# Patient Record
Sex: FEMALE | Race: WHITE | Employment: FULL TIME | URBAN - METROPOLITAN AREA
[De-identification: names, ages, dates, MRNs, and addresses within clinical notes are randomized per-mention and may not be internally consistent; named-entity substitution may affect disease eponyms.]

---

## 2017-01-04 ENCOUNTER — HOSPITAL ENCOUNTER (OUTPATIENT)
Facility: HOSPITAL | Age: 37
Discharge: HOME | End: 2017-01-04
Attending: SURGERY
Payer: COMMERCIAL

## 2017-01-04 ENCOUNTER — OFFICE VISIT (OUTPATIENT)
Dept: OBGYN CLINIC | Facility: CLINIC | Age: 37
End: 2017-01-04

## 2017-01-04 ENCOUNTER — OFFICE VISIT (OUTPATIENT)
Dept: SURGERY | Facility: CLINIC | Age: 37
End: 2017-01-04

## 2017-01-04 VITALS
DIASTOLIC BLOOD PRESSURE: 72 MMHG | WEIGHT: 169.5 LBS | SYSTOLIC BLOOD PRESSURE: 117 MMHG | BODY MASS INDEX: 26.92 KG/M2 | HEART RATE: 74 BPM | HEIGHT: 66.5 IN

## 2017-01-04 VITALS — HEIGHT: 66.5 IN

## 2017-01-04 DIAGNOSIS — Z91.89 AT HIGH RISK FOR BREAST CANCER: Primary | ICD-10-CM

## 2017-01-04 DIAGNOSIS — Z30.431 INTRAUTERINE DEVICE SURVEILLANCE: Primary | ICD-10-CM

## 2017-01-04 DIAGNOSIS — R12 HEARTBURN: ICD-10-CM

## 2017-01-04 PROCEDURE — 99213 OFFICE O/P EST LOW 20 MIN: CPT | Performed by: ADVANCED PRACTICE MIDWIFE

## 2017-01-04 PROCEDURE — 99211 OFF/OP EST MAY X REQ PHY/QHP: CPT | Performed by: SURGERY

## 2017-01-04 PROCEDURE — 99214 OFFICE O/P EST MOD 30 MIN: CPT | Performed by: SURGERY

## 2017-01-04 NOTE — PROGRESS NOTES
High [facility] Breast Cancer Scre[facility]    History of Present Illness:   14-year-old woman who returns for follow-up related to her high risk of developing breast cancer secondary to her family history, which is detailed below.  The patient' mouth daily. Disp: 30 tablet Rfl: 12     No current facility-administered medications on file prior to visit.   Allergies:   No known drug allergies   No latex allergies   Family History:   She is not of Ashkenazi [place] ancestry   Mother with breast cancer change in bowel habits, diarrhea, abdominal pain or vomiting blood.  +reflux symptoms   Genitourinary:   The patient denies frequent urination, needing to get up at night to urinate, urinary hesitancy or retaining urine, painful urination, urinary incontine murmurs, rubs, gallops or thrills. Breasts: Her breasts are symmetrical with a cup size DD. Right breast[de-identified] The skin, nipple ,and areola appear normal. There is no skin dimpling with movement of the pectoralis. There is no nipple retraction.  No nipple d risk estimates to her and answered questions she had pertaining to her level of risk. The patient's lifetime risk for breast cancer is elevated when compared to her peer group.  We discussed factors that would further increase her risk for breast cancer jeanette moderation in alcohol use, and avoidance of long-term hormone replacement therapy in the future. We also discussed the benefit of a cumulative time of eighteen months or more of breastfeeding.    We then touched upon the issue of the Left breast sebaceous c

## 2017-01-04 NOTE — PROGRESS NOTES
Follow up: clinical surveillance visit    Education Record    Learner:  Patient    Disease / Diagnosis: clinical surveillance visit    Barriers / Limitations:  None   Comments:    Method:  Discussion   Comments:    General Topics:  Plan of care reviewed

## 2017-01-04 NOTE — PROGRESS NOTES
HPI:    Patient ID: Aylin Meyer is a 39year old female here for IUD check. Had Paragard placed last week and afraid that came out. Is unable to feel strings.  Not sure likes it, feels like it decreased her milk supply, gave her insomnia and headache

## 2017-01-24 ENCOUNTER — TELEPHONE (OUTPATIENT)
Dept: OBGYN CLINIC | Facility: CLINIC | Age: 37
End: 2017-01-24

## 2017-01-25 NOTE — TELEPHONE ENCOUNTER
Pt states cancelled IUD check appt scheduled for today as was here on 1/14/17 for IUD check due to concerns and as was unable to feel strings.  Pt states is just starting a new job and is \"hard to take time off\" Per MES while in the office if pt can feel

## 2017-03-24 ENCOUNTER — TELEPHONE (OUTPATIENT)
Dept: OBGYN CLINIC | Facility: CLINIC | Age: 37
End: 2017-03-24

## 2017-03-28 RX ORDER — VALACYCLOVIR HYDROCHLORIDE 1 G/1
TABLET, FILM COATED ORAL
Qty: 21 TABLET | Refills: 0 | Status: SHIPPED | OUTPATIENT
Start: 2017-03-28 | End: 2017-08-22

## 2017-03-28 NOTE — TELEPHONE ENCOUNTER
Message noted: Chart reviewed and may refill medication as requested. Prescription sent to listed pharmacy. Please notify patient.  Further refills as per Dr. Kang So

## 2017-03-28 NOTE — TELEPHONE ENCOUNTER
LMTCB on  h#, RN ext [phone] till 730 pm.   pls triage when pt call back, thanks.          Refill Protocol Appointment Criteria  · Appointment scheduled in the past 6 months or in the next 3 months  Recent Visits       Provider Department Primary Dx    1 ye

## 2017-08-09 ENCOUNTER — OFFICE VISIT (OUTPATIENT)
Dept: OBGYN CLINIC | Facility: CLINIC | Age: 37
End: 2017-08-09

## 2017-08-09 VITALS
BODY MASS INDEX: 28 KG/M2 | DIASTOLIC BLOOD PRESSURE: 80 MMHG | WEIGHT: 174.38 LBS | HEART RATE: 84 BPM | SYSTOLIC BLOOD PRESSURE: 128 MMHG

## 2017-08-09 DIAGNOSIS — Z30.431 IUD CHECK UP: Primary | ICD-10-CM

## 2017-08-09 PROCEDURE — 99213 OFFICE O/P EST LOW 20 MIN: CPT | Performed by: ADVANCED PRACTICE MIDWIFE

## 2017-08-22 ENCOUNTER — OFFICE VISIT (OUTPATIENT)
Dept: FAMILY MEDICINE CLINIC | Facility: CLINIC | Age: 37
End: 2017-08-22

## 2017-08-22 VITALS
DIASTOLIC BLOOD PRESSURE: 81 MMHG | BODY MASS INDEX: 28.13 KG/M2 | SYSTOLIC BLOOD PRESSURE: 123 MMHG | HEIGHT: 66 IN | TEMPERATURE: 98 F | WEIGHT: 175 LBS | RESPIRATION RATE: 20 BRPM | HEART RATE: 80 BPM

## 2017-08-22 DIAGNOSIS — M25.562 ACUTE PAIN OF LEFT KNEE: Primary | ICD-10-CM

## 2017-08-22 PROCEDURE — 99212 OFFICE O/P EST SF 10 MIN: CPT | Performed by: FAMILY MEDICINE

## 2017-08-22 PROCEDURE — 99213 OFFICE O/P EST LOW 20 MIN: CPT | Performed by: FAMILY MEDICINE

## 2017-08-22 RX ORDER — VALACYCLOVIR HYDROCHLORIDE 1 G/1
1 TABLET, FILM COATED ORAL EVERY 12 HOURS SCHEDULED
Qty: 20 TABLET | Refills: 0 | Status: SHIPPED | OUTPATIENT
Start: 2017-08-22 | End: 2018-04-01

## 2017-08-22 NOTE — PROGRESS NOTES
HPI:    Patient ID: Martha Ratliff is a 39year old female. Pt presents with left knee pain that began yesterday after running. Pt denies any acute injury. Pt had pain under the knee cap. Pt has had no fall or injury. NO swelling or redness.

## 2017-11-20 ENCOUNTER — OFFICE VISIT (OUTPATIENT)
Dept: OBGYN CLINIC | Facility: CLINIC | Age: 37
End: 2017-11-20

## 2017-11-20 ENCOUNTER — TELEPHONE (OUTPATIENT)
Dept: OBGYN CLINIC | Facility: CLINIC | Age: 37
End: 2017-11-20

## 2017-11-20 VITALS
DIASTOLIC BLOOD PRESSURE: 74 MMHG | HEART RATE: 67 BPM | WEIGHT: 171 LBS | SYSTOLIC BLOOD PRESSURE: 120 MMHG | BODY MASS INDEX: 28 KG/M2

## 2017-11-20 DIAGNOSIS — Z30.431 SURVEILLANCE OF (INTRAUTERINE) CONTRACEPTIVE DEVICE: Primary | ICD-10-CM

## 2017-11-20 DIAGNOSIS — N92.6 IRREGULAR BLEEDING: ICD-10-CM

## 2017-11-20 DIAGNOSIS — N89.8 VAGINAL DISCHARGE: ICD-10-CM

## 2017-11-20 PROCEDURE — 81025 URINE PREGNANCY TEST: CPT | Performed by: ADVANCED PRACTICE MIDWIFE

## 2017-11-20 PROCEDURE — 99213 OFFICE O/P EST LOW 20 MIN: CPT | Performed by: ADVANCED PRACTICE MIDWIFE

## 2017-11-20 NOTE — TELEPHONE ENCOUNTER
Per the pt she has an IUD, and has had vaginal bleeding for about 12 days. The pt would like to speak with a nurse. Please advise.

## 2017-11-20 NOTE — TELEPHONE ENCOUNTER
Has had iud x5 mos. Has had 4 normal periods. This month having heavy period lasting 12 days. Soaked through pad today. Denies any pain. Has not checked strings. Offered appt w/ MBW today. Pt accepted.  Verbalized an understanding & agrees w/ plan

## 2017-11-21 NOTE — PROGRESS NOTES
HPI:   Anand Diallo is a 40year old female who presents for a gyne Problem. Excessive and long menses with paragard. Happy with method otherwise. This period x 12 days. Has periods q35+ days.  monogamous, has not palpated strings.       Wt Raul Mejia Father    • Cancer Maternal Grandfather      Esophageal   • Cancer Paternal Grandfather      Prostate and Esophageal   • Breast Cancer Maternal Cousin Female       Social History:   Smoking status: Never Smoker

## 2017-11-22 RX ORDER — FLUCONAZOLE 150 MG/1
150 TABLET ORAL
Qty: 2 TABLET | Refills: 0 | Status: SHIPPED | OUTPATIENT
Start: 2017-11-22 | End: 2017-11-26

## 2017-11-28 ENCOUNTER — APPOINTMENT (OUTPATIENT)
Dept: GENERAL RADIOLOGY | Facility: HOSPITAL | Age: 37
End: 2017-11-28
Attending: EMERGENCY MEDICINE
Payer: COMMERCIAL

## 2017-11-28 ENCOUNTER — NURSE TRIAGE (OUTPATIENT)
Dept: OTHER | Age: 37
End: 2017-11-28

## 2017-11-28 ENCOUNTER — HOSPITAL ENCOUNTER (EMERGENCY)
Facility: HOSPITAL | Age: 37
Discharge: HOME | End: 2017-11-28
Attending: EMERGENCY MEDICINE
Payer: COMMERCIAL

## 2017-11-28 VITALS
HEIGHT: 67 IN | BODY MASS INDEX: 26.37 KG/M2 | OXYGEN SATURATION: 97 % | WEIGHT: 168 LBS | DIASTOLIC BLOOD PRESSURE: 71 MMHG | SYSTOLIC BLOOD PRESSURE: 129 MMHG | TEMPERATURE: 97 F | RESPIRATION RATE: 15 BRPM | HEART RATE: 67 BPM

## 2017-11-28 DIAGNOSIS — R07.89 CHEST PAIN, ATYPICAL: Primary | ICD-10-CM

## 2017-11-28 PROCEDURE — 93005 ELECTROCARDIOGRAM TRACING: CPT

## 2017-11-28 PROCEDURE — 81025 URINE PREGNANCY TEST: CPT

## 2017-11-28 PROCEDURE — 99285 EMERGENCY DEPT VISIT HI MDM: CPT

## 2017-11-28 PROCEDURE — 84484 ASSAY OF TROPONIN QUANT: CPT | Performed by: EMERGENCY MEDICINE

## 2017-11-28 PROCEDURE — 80048 BASIC METABOLIC PNL TOTAL CA: CPT

## 2017-11-28 PROCEDURE — 84484 ASSAY OF TROPONIN QUANT: CPT

## 2017-11-28 PROCEDURE — 80048 BASIC METABOLIC PNL TOTAL CA: CPT | Performed by: EMERGENCY MEDICINE

## 2017-11-28 PROCEDURE — 93010 ELECTROCARDIOGRAM REPORT: CPT | Performed by: INTERNAL MEDICINE

## 2017-11-28 PROCEDURE — 71010 XR CHEST AP PORTABLE  (CPT=71010): CPT | Performed by: EMERGENCY MEDICINE

## 2017-11-28 PROCEDURE — 85025 COMPLETE CBC W/AUTO DIFF WBC: CPT

## 2017-11-28 PROCEDURE — 36415 COLL VENOUS BLD VENIPUNCTURE: CPT

## 2017-11-28 PROCEDURE — 85379 FIBRIN DEGRADATION QUANT: CPT | Performed by: EMERGENCY MEDICINE

## 2017-11-28 PROCEDURE — 85025 COMPLETE CBC W/AUTO DIFF WBC: CPT | Performed by: EMERGENCY MEDICINE

## 2017-11-28 NOTE — TELEPHONE ENCOUNTER
Action Requested: Summary for Provider     []  Critical Lab, Recommendations Needed  [] Need Additional Advice  [x]   FYI    []   Need Orders  [] Need Medications Sent to Pharmacy  []  Other     SUMMARY: Advised ER per protocol.  Pt agrees and states will g

## 2017-11-28 NOTE — ED PROVIDER NOTES
Patient Seen in: [facility] AND [facility] Emergency Department    History   No chief complaint on file.     Stated Complaint: Chest Pain    HPI    Healthy 26-year-old female who is had several episodes of nonexertional anterior chest tightness relieved by Darling  Normocephalic and atraumatic. Eyes: Conjunctivae are normal. Pupils are equal, round, and reactive to light. Neck: Normal range of motion. Neck supple. Cardiovascular: Normal rate, regular rhythm and intact distal pulses. No obvious murmur.   Pulmona 1306  ------------------------------------------------------------       MDM   Patient's EKGs and troponins are normal.  Her d-dimer is normal.  She has low risk with a heart score of 0.   Her symptoms are nonexertional and somewhat atypical.  She knows to

## 2017-11-28 NOTE — ED INITIAL ASSESSMENT (HPI)
Pt states chest tightness since this past Saturday- states that she took ASA and it went away- states woke up during the night and felt it- pt states heavy cardiac family hx so would like to be checked out. Denies SOB, f/c, cough. ASA taken today at 0800.

## 2017-11-29 NOTE — TELEPHONE ENCOUNTER
LMTCB    Please transfer to RN Triage. Thank you. Disposition note copied from ED visit 11/28/17 as below;     Disposition and Plan      Clinical Impression:  Chest pain, atypical  (primary encounter diagnosis)     Disposition:  Discharge  11/28/2017

## 2017-11-30 NOTE — TELEPHONE ENCOUNTER
Patient returning call, states she is feeling fine today. Booked pt for ER f/u visit with Dr Eduar Overton in [facility] for Saturday 11:10.

## 2017-12-02 ENCOUNTER — OFFICE VISIT (OUTPATIENT)
Dept: FAMILY MEDICINE CLINIC | Facility: CLINIC | Age: 37
End: 2017-12-02

## 2017-12-02 VITALS
RESPIRATION RATE: 18 BRPM | TEMPERATURE: 98 F | WEIGHT: 172 LBS | HEIGHT: 66 IN | SYSTOLIC BLOOD PRESSURE: 111 MMHG | HEART RATE: 65 BPM | DIASTOLIC BLOOD PRESSURE: 73 MMHG | BODY MASS INDEX: 27.64 KG/M2

## 2017-12-02 DIAGNOSIS — R07.9 CHEST PAIN, UNSPECIFIED TYPE: ICD-10-CM

## 2017-12-02 PROCEDURE — 99213 OFFICE O/P EST LOW 20 MIN: CPT | Performed by: FAMILY MEDICINE

## 2017-12-02 PROCEDURE — 99212 OFFICE O/P EST SF 10 MIN: CPT | Performed by: FAMILY MEDICINE

## 2017-12-02 NOTE — PROGRESS NOTES
HPI:    Patient ID: Amber Sotelo is a 40year old female. Pt presents for follow up from the ER for chest pains. Pt had some central chest pains after working out. Patient has some family hx of CAD. Patient states symptoms are resolved.  Pt had wor week        Review of Systems     Positive for stated complaint: Chest Pain  Other systems are as noted in HPI.   Constitutional and vital signs reviewed.       All other systems reviewed and negative except as noted above.       Physical Exam  ED Triage Vi Abnormality         Status                     ---------                               -----------         ------                     CBC W/ DIFFERENTIAL([id number])          Normal              Final result                  Please view Rfl:      Allergies:No Known Allergies   PHYSICAL EXAM:   Physical Exam   Constitutional: She appears well-developed and well-nourished. Cardiovascular: Normal rate, regular rhythm and normal heart sounds. No murmur heard.   Pulmonary/Chest: Effort nor

## 2018-01-23 ENCOUNTER — TELEPHONE (OUTPATIENT)
Dept: OBGYN CLINIC | Facility: CLINIC | Age: 38
End: 2018-01-23

## 2018-01-23 NOTE — TELEPHONE ENCOUNTER
Pt keeps getting automated message advising of annual. Advised pt although she had appt in Nov. She is due for pap 3/18. Appt scheduled.  Pt verbalized an understanding & agrees w/ plan

## 2018-01-23 NOTE — TELEPHONE ENCOUNTER
PER PT STATE SHE RECEIVED A CALL THAT STATE SHE NEED TO COME IN FOR AN ANNUAL EXAM / PT STATE SHE WAS JUST HERE 11/17 / PT WANT TO KNOW IF IT'S NECESSARY FOR HER TO COME IN / PLS ADV

## 2018-03-21 ENCOUNTER — OFFICE VISIT (OUTPATIENT)
Dept: FAMILY MEDICINE CLINIC | Facility: CLINIC | Age: 38
End: 2018-03-21

## 2018-03-21 VITALS
DIASTOLIC BLOOD PRESSURE: 64 MMHG | WEIGHT: 166 LBS | OXYGEN SATURATION: 99 % | SYSTOLIC BLOOD PRESSURE: 116 MMHG | HEART RATE: 86 BPM | TEMPERATURE: 98 F | BODY MASS INDEX: 27 KG/M2

## 2018-03-21 DIAGNOSIS — H10.31 ACUTE BACTERIAL CONJUNCTIVITIS OF RIGHT EYE: Primary | ICD-10-CM

## 2018-03-21 DIAGNOSIS — J02.9 SORE THROAT: ICD-10-CM

## 2018-03-21 LAB
CONTROL LINE PRESENT WITH A CLEAR BACKGROUND (YES/NO): YES YES/NO
STREP GRP A CUL-SCR: NEGATIVE

## 2018-03-21 PROCEDURE — 99203 OFFICE O/P NEW LOW 30 MIN: CPT | Performed by: PHYSICIAN ASSISTANT

## 2018-03-21 PROCEDURE — 87880 STREP A ASSAY W/OPTIC: CPT | Performed by: PHYSICIAN ASSISTANT

## 2018-03-21 RX ORDER — POLYMYXIN B SULFATE AND TRIMETHOPRIM 1; 10000 MG/ML; [USP'U]/ML
1 SOLUTION OPHTHALMIC EVERY 6 HOURS
Qty: 10 ML | Refills: 0 | Status: SHIPPED | OUTPATIENT
Start: 2018-03-21 | End: 2018-03-28

## 2018-03-21 NOTE — PATIENT INSTRUCTIONS
Conjunctivitis is very contagious. Avoid touching and rubbing eyes and wash hands frequently. You will need to be out of school/work until you have been on antibiotics for a full 24 hours.   If your symptoms do not start to improve in 24-48 hours you shoul © 9049-1411 [address], [address] [place]. All rights reserved. This information is not intended as a substitute for professional medical care. Always follow your healthcare professional's instructions.

## 2018-03-21 NOTE — PROGRESS NOTES
CHIEF COMPLAINT:   Patient presents with:  Pink Eye      HPI:   Shirley Leiva is a 40year old female who presents with chief complaint of \"pink eye\". Symptoms began  2  days ago. Symptoms have been stable since onset.    Patient reports right eye re Smoking status: Never Smoker                                                              Smokeless tobacco: Never Used                      Alcohol use: Yes           1.8 oz/week     Glasses of wine: 3 per week        REVIEW OF SYSTEMS:   GENERAL: feels w Sig: Place 1 drop into the right eye every 6 (six) hours. Risks, benefits, complications and side effects of meds discussed. Advised patient to avoid touching eyes.   Stressed importance of good handwashing as conjunctivitis is very contag Conjunctivitis usually isn't serious. But some more serious eye diseases have similar symptoms, so it's important for an eye doctor to diagnose you.  Your eye doctor will ask about your symptoms, any medications you take, and any illnesses or medical condit

## 2018-04-03 RX ORDER — VALACYCLOVIR HYDROCHLORIDE 1 G/1
TABLET, FILM COATED ORAL
Qty: 20 TABLET | Refills: 0 | Status: SHIPPED | OUTPATIENT
Start: 2018-04-03 | End: 2018-07-14

## 2018-04-03 RX ORDER — VALACYCLOVIR HYDROCHLORIDE 1 G/1
TABLET, FILM COATED ORAL
Qty: 21 TABLET | Refills: 0 | OUTPATIENT
Start: 2018-04-03

## 2018-04-03 NOTE — TELEPHONE ENCOUNTER
No Protocol on this med.        Pending Prescriptions Disp Refills    VALACYCLOVIR HCL 1 G Oral Tab [Pharmacy Med Name: ValACYclovir HCl Oral Tablet 1 GM] 21 tablet 0     Sig: TAKE ONE TABLET BY MOUTH THREE TIMES A DAY      VALACYCLOVIR HCL 1 G Oral Tab [Ph

## 2018-07-14 ENCOUNTER — OFFICE VISIT (OUTPATIENT)
Dept: FAMILY MEDICINE CLINIC | Facility: CLINIC | Age: 38
End: 2018-07-14

## 2018-07-14 VITALS
DIASTOLIC BLOOD PRESSURE: 75 MMHG | TEMPERATURE: 98 F | BODY MASS INDEX: 26.06 KG/M2 | RESPIRATION RATE: 18 BRPM | SYSTOLIC BLOOD PRESSURE: 120 MMHG | HEART RATE: 67 BPM | HEIGHT: 67 IN | WEIGHT: 166 LBS

## 2018-07-14 DIAGNOSIS — B00.1 RECURRENT COLD SORES: ICD-10-CM

## 2018-07-14 PROCEDURE — 99213 OFFICE O/P EST LOW 20 MIN: CPT | Performed by: FAMILY MEDICINE

## 2018-07-14 PROCEDURE — 99212 OFFICE O/P EST SF 10 MIN: CPT | Performed by: FAMILY MEDICINE

## 2018-07-14 RX ORDER — VALACYCLOVIR HYDROCHLORIDE 1 G/1
1 TABLET, FILM COATED ORAL DAILY
Qty: 30 TABLET | Refills: 5 | Status: SHIPPED | OUTPATIENT
Start: 2018-07-14 | End: 2018-08-11

## 2018-07-14 NOTE — PROGRESS NOTES
HPI:    Patient ID: Nicolas Colin is a 40year old female. Pt has had cold sores periodically every few months and is here to discuss suppressive treatment with valtrex.  Pt also has had some allergy symptoms as well and has been taking claritin D b

## 2018-08-10 ENCOUNTER — TELEPHONE (OUTPATIENT)
Dept: FAMILY MEDICINE CLINIC | Facility: CLINIC | Age: 38
End: 2018-08-10

## 2018-08-10 NOTE — TELEPHONE ENCOUNTER
Pt called in requesting to change the following prescription so that she does not have to take 1 g all at one time:  Pt is requesting to change her medication to 500 mg twice daily.   Please advise     Current Outpatient Prescriptions:   •  ValACYclovir HCl

## 2018-08-11 RX ORDER — VALACYCLOVIR HYDROCHLORIDE 500 MG/1
0.5 TABLET, FILM COATED ORAL EVERY 12 HOURS SCHEDULED
Qty: 60 TABLET | Refills: 5 | Status: SHIPPED | OUTPATIENT
Start: 2018-08-11 | End: 2021-07-12

## 2018-08-11 NOTE — TELEPHONE ENCOUNTER
LMTCB transfer to triage, ask to call if needed, told [id number] message sent.     Me   to [facility]           8/11/18 11:30 AM   Dr Gato Sandy sent the following medicine to your pharmacy: ValACYclovir HCl (VALTREX) 500 MG: Take 1 tablet (500 mg tota

## 2018-10-19 ENCOUNTER — TELEPHONE (OUTPATIENT)
Dept: OBGYN CLINIC | Facility: CLINIC | Age: 38
End: 2018-10-19

## 2018-10-22 ENCOUNTER — TELEPHONE (OUTPATIENT)
Dept: SURGERY | Facility: CLINIC | Age: 38
End: 2018-10-22

## 2018-10-22 ENCOUNTER — OFFICE VISIT (OUTPATIENT)
Dept: OBGYN CLINIC | Facility: CLINIC | Age: 38
End: 2018-10-22
Payer: COMMERCIAL

## 2018-10-22 VITALS
WEIGHT: 169.25 LBS | HEIGHT: 67 IN | HEART RATE: 69 BPM | BODY MASS INDEX: 26.56 KG/M2 | SYSTOLIC BLOOD PRESSURE: 133 MMHG | DIASTOLIC BLOOD PRESSURE: 78 MMHG

## 2018-10-22 DIAGNOSIS — Z30.432 ENCOUNTER FOR IUD REMOVAL: Primary | ICD-10-CM

## 2018-10-22 DIAGNOSIS — Z30.432 ENCOUNTER FOR REMOVAL OF INTRAUTERINE CONTRACEPTIVE DEVICE: ICD-10-CM

## 2018-10-22 DIAGNOSIS — Z30.8: ICD-10-CM

## 2018-10-22 PROCEDURE — 99213 OFFICE O/P EST LOW 20 MIN: CPT | Performed by: ADVANCED PRACTICE MIDWIFE

## 2018-10-22 RX ORDER — DIAPHRAGMS, CONTOURED 65 MM-80MM
1 DIAPHRAGM VAGINAL AS NEEDED
Qty: 1 EACH | Refills: 0 | Status: SHIPPED | OUTPATIENT
Start: 2018-10-22 | End: 2019-12-12

## 2018-10-22 NOTE — PROGRESS NOTES
HPI:    Patient ID: Annel Shafer is a 45year old female. Patient presents for removal of paraguard. She has had heavy and long periods and is no longer happy with this method. Not attempting pregnancy but would be open to it.   Open to Zakiya Cary

## 2018-10-22 NOTE — TELEPHONE ENCOUNTER
Pt phoned in regarding imaging orders. Chart reviewed, and her last office visit was Jan 2017. I let her know she needs to be seen for clinical exam prior to getting orders. Pt verbalized understanding, and call transferred to schedule.

## 2018-10-22 NOTE — PROCEDURES
IUD Removal       Birth control method(s) used: Paragard  ; date last used:  10/22/18  Consent signed. Procedure discussed with the patient in detail including indication, risks, benefits, alternatives and complications.     Pelvic Exam Findings:  Normal a

## 2018-11-28 ENCOUNTER — OFFICE VISIT (OUTPATIENT)
Dept: SURGERY | Facility: CLINIC | Age: 38
End: 2018-11-28
Payer: COMMERCIAL

## 2018-11-28 VITALS
BODY MASS INDEX: 26.68 KG/M2 | WEIGHT: 170 LBS | SYSTOLIC BLOOD PRESSURE: 119 MMHG | OXYGEN SATURATION: 99 % | HEIGHT: 67 IN | DIASTOLIC BLOOD PRESSURE: 68 MMHG | RESPIRATION RATE: 18 BRPM | HEART RATE: 83 BPM

## 2018-11-28 DIAGNOSIS — Z12.39 BREAST CANCER SCREENING, HIGH RISK PATIENT: ICD-10-CM

## 2018-11-28 DIAGNOSIS — N60.19 FIBROCYSTIC BREAST DISEASE (FCBD), UNSPECIFIED LATERALITY: Primary | ICD-10-CM

## 2018-11-28 PROCEDURE — 99214 OFFICE O/P EST MOD 30 MIN: CPT | Performed by: SURGERY

## 2018-11-28 NOTE — PROGRESS NOTES
High Risk Breast Cancer Screening and Prevention Clinic    History of Present Illness:   43-year-old woman who returns for follow-up related to her high risk of developing breast cancer secondary to her family history, which is detailed below.   She denies Maternal Great Aunt with breast cancer in her 63's,  in her 63's. Maternal third cousin with breast cancer at 39,  at the age of 48.    Maternal cousin with breast cancer at 61, alive   Paternal grandfather with prostate cancer at the ag vaginal/penile discharge. Skin:   The patient denies rash, itching, skin lesions, change in skin color or change in moles. +dry skin   Hematologic/Lymphatic:   The patient denies easily bruising or bleeding or persistent swollen glands or lymph nodes. with movement of the pectoralis. There is no nipple retraction. No nipple discharge can be elicited. The parenchyma is mildly nodular.  There are no dominant masses in the breast. The axillary tail is normal.   Left breast: The skin, nipple, and areola appe discussed factors that would further increase her risk for breast cancer over time to include age, future breast biopsy, as well as additional family members that may be diagnosed with breast cancer.    We then turned our discussion towards genetic counseli breastfeeding.    We then touched upon the issue of the Left breast sebaceous cyst which she reports has become inflamed and infected in the past. I described that the standard management of sebaceous cysts is typically with surgical excision to remove the

## 2018-12-11 ENCOUNTER — HOSPITAL ENCOUNTER (OUTPATIENT)
Dept: MAMMOGRAPHY | Age: 38
Discharge: HOME | End: 2018-12-11
Attending: SURGERY
Payer: COMMERCIAL

## 2018-12-11 DIAGNOSIS — Z12.39 BREAST CANCER SCREENING, HIGH RISK PATIENT: ICD-10-CM

## 2018-12-11 PROCEDURE — 77063 BREAST TOMOSYNTHESIS BI: CPT | Performed by: SURGERY

## 2018-12-11 PROCEDURE — 77067 SCR MAMMO BI INCL CAD: CPT | Performed by: SURGERY

## 2019-11-11 ENCOUNTER — TELEPHONE (OUTPATIENT)
Dept: OBGYN CLINIC | Facility: CLINIC | Age: 39
End: 2019-11-11

## 2019-11-11 NOTE — TELEPHONE ENCOUNTER
Pt is looking to start \"lo estrogen birth control\" wondering if it can be sent to pharm or would she need to come in to discuss.  Had IUD removal last year

## 2019-11-11 NOTE — TELEPHONE ENCOUNTER
Pt is requesting to start ocp's. Pt was advised she is overdue for an Annual and Pap. Appt scheduled and pt was advised she can discuss OCP at time of appt. Pt agrees with plan.

## 2019-11-14 ENCOUNTER — OFFICE VISIT (OUTPATIENT)
Dept: OBGYN CLINIC | Facility: CLINIC | Age: 39
End: 2019-11-14
Payer: COMMERCIAL

## 2019-11-14 VITALS
HEART RATE: 70 BPM | DIASTOLIC BLOOD PRESSURE: 94 MMHG | HEIGHT: 67 IN | BODY MASS INDEX: 27 KG/M2 | WEIGHT: 172 LBS | SYSTOLIC BLOOD PRESSURE: 145 MMHG

## 2019-11-14 DIAGNOSIS — Z30.430 ENCOUNTER FOR INSERTION OF INTRAUTERINE CONTRACEPTIVE DEVICE: ICD-10-CM

## 2019-11-14 DIAGNOSIS — Z01.419 ENCOUNTER FOR ANNUAL ROUTINE GYNECOLOGICAL EXAMINATION: Primary | ICD-10-CM

## 2019-11-14 DIAGNOSIS — R03.0 ELEVATED BLOOD PRESSURE READING: ICD-10-CM

## 2019-11-14 PROCEDURE — 58300 INSERT INTRAUTERINE DEVICE: CPT | Performed by: ADVANCED PRACTICE MIDWIFE

## 2019-11-14 PROCEDURE — 99395 PREV VISIT EST AGE 18-39: CPT | Performed by: ADVANCED PRACTICE MIDWIFE

## 2019-11-14 NOTE — PROGRESS NOTES
HPI:    Patient ID: Nicolas Colin is a 44year old female who presents for annual gyne exam.  Lives with  and 3 children. Denies any abuse. Denies excessive ETOH ,vaping or any drug use.   Menses q 35 days x 5days this month had a 26 days cycl No vaginal discharge, erythema, tenderness or bleeding. No erythema, tenderness or bleeding in the vagina. No signs of injury in the vagina. Neurological: She is alert and oriented to person, place, and time. Skin: Skin is warm and dry.    Psychi

## 2019-11-19 NOTE — PROCEDURES
IUD Insertion     Pregnancy Results: negative from urine test   Birth control method(s) used:  ; condoms      Consent signed. Procedure discussed with the patient in detail including indication, risks, benefits, alternatives and complications.     Pelvic E

## 2019-12-12 ENCOUNTER — OFFICE VISIT (OUTPATIENT)
Dept: OBGYN CLINIC | Facility: CLINIC | Age: 39
End: 2019-12-12
Payer: COMMERCIAL

## 2019-12-12 VITALS
BODY MASS INDEX: 27 KG/M2 | DIASTOLIC BLOOD PRESSURE: 71 MMHG | SYSTOLIC BLOOD PRESSURE: 138 MMHG | WEIGHT: 173.19 LBS | HEART RATE: 73 BPM

## 2019-12-12 DIAGNOSIS — Z30.431 IUD CHECK UP: Primary | ICD-10-CM

## 2019-12-12 PROCEDURE — 99212 OFFICE O/P EST SF 10 MIN: CPT | Performed by: ADVANCED PRACTICE MIDWIFE

## 2019-12-12 NOTE — PROGRESS NOTES
Pt is here for IUD string check. Pt denies any additional cramping. Spotting since placement. Denies any pain. P/E Limited  Uterus: non tender; not enlarged  Adnexal: bilateral without tenderness  Cervix: CMT negative  Spec exam: IUD strings visualized.

## 2019-12-29 ENCOUNTER — MOBILE ENCOUNTER (OUTPATIENT)
Dept: OBGYN CLINIC | Facility: CLINIC | Age: 39
End: 2019-12-29

## 2019-12-29 ENCOUNTER — LAB ENCOUNTER (OUTPATIENT)
Dept: LAB | Facility: HOSPITAL | Age: 39
End: 2019-12-29
Attending: ADVANCED PRACTICE MIDWIFE
Payer: COMMERCIAL

## 2019-12-29 DIAGNOSIS — Z13.220 SCREENING CHOLESTEROL LEVEL: ICD-10-CM

## 2019-12-29 DIAGNOSIS — Z00.00 ROUTINE HEALTH MAINTENANCE: ICD-10-CM

## 2019-12-29 DIAGNOSIS — Z13.29 SCREENING FOR THYROID DISORDER: ICD-10-CM

## 2019-12-29 DIAGNOSIS — Z00.00 ROUTINE HEALTH MAINTENANCE: Primary | ICD-10-CM

## 2019-12-29 DIAGNOSIS — N83.209 CYST OF OVARY, UNSPECIFIED LATERALITY: ICD-10-CM

## 2019-12-29 PROCEDURE — 84443 ASSAY THYROID STIM HORMONE: CPT

## 2019-12-29 PROCEDURE — 85025 COMPLETE CBC W/AUTO DIFF WBC: CPT

## 2019-12-29 PROCEDURE — 36415 COLL VENOUS BLD VENIPUNCTURE: CPT

## 2019-12-29 PROCEDURE — 80061 LIPID PANEL: CPT

## 2019-12-29 PROCEDURE — 80053 COMPREHEN METABOLIC PANEL: CPT

## 2019-12-31 NOTE — PROGRESS NOTES
Patient called requestion routine health maintenance labs and pelvic ultrasound for possible cyst rupture.  Orders placed

## 2020-01-28 ENCOUNTER — TELEPHONE (OUTPATIENT)
Dept: OBGYN CLINIC | Facility: CLINIC | Age: 40
End: 2020-01-28

## 2020-01-29 NOTE — TELEPHONE ENCOUNTER
Reminded pt to complete pelvic u/s. Pt states she tried calling but was on hold & transferred for too long. Offered to transfer call to [facility] 21 pt accepted.  Pt verbalized an understanding & agrees w/ plan

## 2020-02-05 ENCOUNTER — HOSPITAL ENCOUNTER (OUTPATIENT)
Dept: ULTRASOUND IMAGING | Age: 40
Discharge: HOME | End: 2020-02-05
Attending: ADVANCED PRACTICE MIDWIFE
Payer: COMMERCIAL

## 2020-02-05 DIAGNOSIS — N83.209 CYST OF OVARY, UNSPECIFIED LATERALITY: ICD-10-CM

## 2020-02-05 PROCEDURE — 76830 TRANSVAGINAL US NON-OB: CPT | Performed by: ADVANCED PRACTICE MIDWIFE

## 2020-02-07 ENCOUNTER — TELEPHONE (OUTPATIENT)
Dept: OBGYN CLINIC | Facility: CLINIC | Age: 40
End: 2020-02-07

## 2020-02-07 NOTE — TELEPHONE ENCOUNTER
Offered to pt to schedule f/u appt to discuss results. Pt hoping to have phone conversation. Pt states that MES was supposed to order u/s as that was who she saw for the problem visit.  MBW was notified when she was on call when pt went to complete labs & s

## 2020-02-07 NOTE — TELEPHONE ENCOUNTER
Pt wants to know if physician can explain results  Wants to know if she has fibroids.  If they are new or the same compared to past results  Also wants to know about treatment      Ok to leave a detailed vm

## 2020-05-14 ENCOUNTER — APPOINTMENT (OUTPATIENT)
Dept: LAB | Facility: HOSPITAL | Age: 40
End: 2020-05-14
Attending: ADVANCED PRACTICE MIDWIFE
Payer: COMMERCIAL

## 2020-05-14 ENCOUNTER — TELEPHONE (OUTPATIENT)
Dept: OBGYN CLINIC | Facility: CLINIC | Age: 40
End: 2020-05-14

## 2020-05-14 DIAGNOSIS — R39.9 UTI SYMPTOMS: Primary | ICD-10-CM

## 2020-05-14 DIAGNOSIS — R39.9 UTI SYMPTOMS: ICD-10-CM

## 2020-05-14 PROCEDURE — 87086 URINE CULTURE/COLONY COUNT: CPT

## 2020-05-14 PROCEDURE — 87088 URINE BACTERIA CULTURE: CPT

## 2020-05-14 PROCEDURE — 87186 SC STD MICRODIL/AGAR DIL: CPT

## 2020-05-14 PROCEDURE — 81001 URINALYSIS AUTO W/SCOPE: CPT

## 2020-05-14 NOTE — TELEPHONE ENCOUNTER
C/o uti symptoms of frequency, dysuria & urgency. Has been taking azo w/ minimal relief. Feels wiped out. Unsure if febrile. Denies back pain. Currently is on her menstrual cycle. Pt does not want to be on abx as she typically gets a yeast infection w/ it.

## 2020-05-15 ENCOUNTER — TELEPHONE (OUTPATIENT)
Dept: OBGYN CLINIC | Facility: CLINIC | Age: 40
End: 2020-05-15

## 2020-05-15 RX ORDER — FLUCONAZOLE 150 MG/1
150 TABLET ORAL ONCE
Qty: 2 TABLET | Refills: 0 | Status: SHIPPED | OUTPATIENT
Start: 2020-05-15 | End: 2020-05-15

## 2020-05-15 RX ORDER — SULFAMETHOXAZOLE AND TRIMETHOPRIM 800; 160 MG/1; MG/1
1 TABLET ORAL 2 TIMES DAILY
Qty: 6 TABLET | Refills: 0 | Status: SHIPPED | OUTPATIENT
Start: 2020-05-15 | End: 2020-05-17 | Stop reason: ALTCHOICE

## 2020-05-15 NOTE — TELEPHONE ENCOUNTER
----- Message from Marsha Yu CNM sent at 5/15/2020  8:57 AM CDT -----  UTI likely - recommend antibiotics - culture pending. Can prescribe yeast medication.

## 2020-05-15 NOTE — TELEPHONE ENCOUNTER
Pt was advised that MBW has reviewed her UA and she likely has a UTI. Culture is pending. Pt denies fever and she states she may be having some back pain at a level os 2/10. It is minimal and \"may be from something else\".   Pt desires treatment at this

## 2020-05-17 ENCOUNTER — TELEPHONE (OUTPATIENT)
Dept: OBGYN CLINIC | Facility: CLINIC | Age: 40
End: 2020-05-17

## 2020-05-17 RX ORDER — CIPROFLOXACIN 250 MG/1
250 TABLET, FILM COATED ORAL 2 TIMES DAILY
Qty: 20 TABLET | Refills: 0 | Status: SHIPPED | OUTPATIENT
Start: 2020-05-17 | End: 2020-05-20

## 2020-05-17 NOTE — TELEPHONE ENCOUNTER
Pt called with culture results and abx sent to pharmacy.   Pt to monitor temp and symptoms to call if no improvement in 24 hrs or call if worsen

## 2020-05-19 ENCOUNTER — TELEPHONE (OUTPATIENT)
Dept: OBGYN CLINIC | Facility: CLINIC | Age: 40
End: 2020-05-19

## 2020-05-19 NOTE — TELEPHONE ENCOUNTER
Pt states she has been on Cipro for the past 36 hours and has seen only slight improvement in her pain. Pt c/o back pain that is constant and she is rating the pain at a 2/10. Pt is very tired, and is having urinary frequency and urgency.   Pt is going to

## 2020-05-19 NOTE — TELEPHONE ENCOUNTER
Pt was advised per TM that she is to see her PCP or Urology tomorrow for evaluation. Pt is to take her temperature and if she develops a fever she is to go immediately to IC or ER. Pt was advised she is to see her PCP or Urology tomorrow for evaluation.

## 2020-05-19 NOTE — TELEPHONE ENCOUNTER
Pt. Requesting to get a call back anytime after 1:30pm., pt. States that she also gives the nurse consent to leave her a detailed voicemail message.

## 2020-05-20 ENCOUNTER — OFFICE VISIT (OUTPATIENT)
Dept: SURGERY | Facility: CLINIC | Age: 40
End: 2020-05-20
Payer: COMMERCIAL

## 2020-05-20 VITALS — TEMPERATURE: 99 F | HEART RATE: 81 BPM | DIASTOLIC BLOOD PRESSURE: 79 MMHG | SYSTOLIC BLOOD PRESSURE: 131 MMHG

## 2020-05-20 DIAGNOSIS — N30.01 ACUTE CYSTITIS WITH HEMATURIA: Primary | ICD-10-CM

## 2020-05-20 PROCEDURE — 3075F SYST BP GE 130 - 139MM HG: CPT | Performed by: NURSE PRACTITIONER

## 2020-05-20 PROCEDURE — 81003 URINALYSIS AUTO W/O SCOPE: CPT | Performed by: NURSE PRACTITIONER

## 2020-05-20 PROCEDURE — 3078F DIAST BP <80 MM HG: CPT | Performed by: NURSE PRACTITIONER

## 2020-05-20 PROCEDURE — 99213 OFFICE O/P EST LOW 20 MIN: CPT | Performed by: NURSE PRACTITIONER

## 2020-05-20 PROCEDURE — 99203 OFFICE O/P NEW LOW 30 MIN: CPT | Performed by: NURSE PRACTITIONER

## 2020-05-20 RX ORDER — CIPROFLOXACIN 500 MG/1
500 TABLET, FILM COATED ORAL 2 TIMES DAILY
Qty: 8 TABLET | Refills: 0 | Status: SHIPPED | OUTPATIENT
Start: 2020-05-20 | End: 2020-05-24

## 2020-05-20 RX ORDER — IBUPROFEN 200 MG
200 TABLET ORAL EVERY 6 HOURS PRN
COMMUNITY
End: 2022-01-26 | Stop reason: ALTCHOICE

## 2020-05-20 NOTE — PROGRESS NOTES
HPI:    Patient ID: Sylvester Castle is a 44year old female. HPI     Patient is a 44year old female who presents to the clinic for a consult regarding UTI. No significant past medical history.     Patient states about 1 week ago she developed sudden 1 tablet (250 mg total) by mouth 2 (two) times daily for 10 days. 20 tablet 0   • ValACYclovir HCl (VALTREX) 500 MG Oral Tab Take 1 tablet (500 mg total) by mouth every 12 (twelve) hours.  60 tablet 5     Allergies:No Known Allergies    HISTORY:  Past Medic who presents to the clinic for a consult re: UTI. Patient with positive urine culture showing E. Coli with resistance to bactrim, getamicin, and ampicillin. Currently taking Cipro 250 mg PO BID and reports symptoms are mildly improving. No fevers.   She
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
## 2020-05-27 ENCOUNTER — TELEPHONE (OUTPATIENT)
Dept: SURGERY | Facility: CLINIC | Age: 40
End: 2020-05-27

## 2020-05-27 DIAGNOSIS — R35.0 URINARY FREQUENCY: Primary | ICD-10-CM

## 2020-05-27 NOTE — TELEPHONE ENCOUNTER
Patient contacted. Patient c/o pain under her rib, thought it was from stretching, then had frequency last night, discomfort with urination last night. Patient denies fever, chills, hematuria.      Patient reports that she finished a course of Cipro 500 m

## 2020-05-28 ENCOUNTER — APPOINTMENT (OUTPATIENT)
Dept: LAB | Facility: HOSPITAL | Age: 40
End: 2020-05-28
Attending: NURSE PRACTITIONER
Payer: COMMERCIAL

## 2020-05-28 DIAGNOSIS — R35.0 URINARY FREQUENCY: ICD-10-CM

## 2020-05-28 PROCEDURE — 81003 URINALYSIS AUTO W/O SCOPE: CPT

## 2020-05-28 PROCEDURE — 87086 URINE CULTURE/COLONY COUNT: CPT

## 2020-06-08 ENCOUNTER — TELEPHONE (OUTPATIENT)
Dept: OBGYN CLINIC | Facility: CLINIC | Age: 40
End: 2020-06-08

## 2020-06-08 NOTE — TELEPHONE ENCOUNTER
Patient appointment made with Rodney Martinez CNM 06/09/2020 at 2:15pm for IUD removal. Patient verbalize understanding.

## 2020-06-09 ENCOUNTER — OFFICE VISIT (OUTPATIENT)
Dept: OBGYN CLINIC | Facility: CLINIC | Age: 40
End: 2020-06-09
Payer: COMMERCIAL

## 2020-06-09 VITALS
BODY MASS INDEX: 27.62 KG/M2 | HEART RATE: 62 BPM | WEIGHT: 176 LBS | DIASTOLIC BLOOD PRESSURE: 79 MMHG | HEIGHT: 67 IN | SYSTOLIC BLOOD PRESSURE: 122 MMHG

## 2020-06-09 DIAGNOSIS — Z30.432 ENCOUNTER FOR IUD REMOVAL: Primary | ICD-10-CM

## 2020-06-09 DIAGNOSIS — Z30.432 ENCOUNTER FOR REMOVAL OF INTRAUTERINE CONTRACEPTIVE DEVICE: ICD-10-CM

## 2020-06-09 PROCEDURE — 81025 URINE PREGNANCY TEST: CPT | Performed by: ADVANCED PRACTICE MIDWIFE

## 2020-06-09 PROCEDURE — 58301 REMOVE INTRAUTERINE DEVICE: CPT | Performed by: ADVANCED PRACTICE MIDWIFE

## 2020-06-09 NOTE — PROCEDURES
IUD Removal     Pregnancy Results: negative from urine test   Birth control method(s) used:  ; date last used:  Mirena  Consent signed. Procedure discussed with the patient in detail including indication, risks, benefits, alternatives and complications.

## 2020-06-11 ENCOUNTER — TELEPHONE (OUTPATIENT)
Dept: SURGERY | Facility: CLINIC | Age: 40
End: 2020-06-11

## 2020-06-11 NOTE — TELEPHONE ENCOUNTER
Returned pt phone call regarding questions related to whether she needs to schedule a clinical exam before she gets imaging done. Informed pt that we would need to see her for an appt before ordering the imaging.   Offered appt for tomorrow in [place]

## 2020-06-12 ENCOUNTER — OFFICE VISIT (OUTPATIENT)
Dept: SURGERY | Facility: CLINIC | Age: 40
End: 2020-06-12
Payer: COMMERCIAL

## 2020-06-12 VITALS
RESPIRATION RATE: 16 BRPM | DIASTOLIC BLOOD PRESSURE: 76 MMHG | OXYGEN SATURATION: 100 % | SYSTOLIC BLOOD PRESSURE: 119 MMHG | HEART RATE: 72 BPM | HEIGHT: 67 IN | BODY MASS INDEX: 27.62 KG/M2 | WEIGHT: 176 LBS

## 2020-06-12 DIAGNOSIS — Z12.31 SCREENING MAMMOGRAM FOR HIGH-RISK PATIENT: Primary | ICD-10-CM

## 2020-06-12 DIAGNOSIS — N60.19 FIBROCYSTIC BREAST DISEASE (FCBD), UNSPECIFIED LATERALITY: ICD-10-CM

## 2020-06-12 DIAGNOSIS — Z80.3 FAMILY HISTORY OF BREAST CANCER IN FIRST DEGREE RELATIVE: ICD-10-CM

## 2020-06-12 PROCEDURE — 99213 OFFICE O/P EST LOW 20 MIN: CPT | Performed by: SURGERY

## 2020-06-13 ENCOUNTER — HOSPITAL ENCOUNTER (OUTPATIENT)
Dept: MAMMOGRAPHY | Age: 40
Discharge: HOME | End: 2020-06-13
Attending: SURGERY
Payer: COMMERCIAL

## 2020-06-13 DIAGNOSIS — Z12.31 SCREENING MAMMOGRAM FOR HIGH-RISK PATIENT: ICD-10-CM

## 2020-06-13 PROCEDURE — 77063 BREAST TOMOSYNTHESIS BI: CPT | Performed by: SURGERY

## 2020-06-13 PROCEDURE — 77067 SCR MAMMO BI INCL CAD: CPT | Performed by: SURGERY

## 2020-06-29 NOTE — PROGRESS NOTES
High Risk Breast Cancer Screening and Prevention Clinic    History of Present Illness:   22-year-old woman who returns for follow-up related to her high risk of developing breast cancer secondary to her family history, which is detailed below.   She denies NEGATIVE for a BRCA 1/2 mutations in . Maternal Great Aunt with breast cancer in her 63's,  in her 63's. Maternal third cousin with breast cancer at 39,  at the age of 48.    Maternal cousin with breast cancer at 61, alive   Paternal urine stream, blood in the urine or vaginal/penile discharge. Skin:   The patient denies rash, itching, skin lesions, change in skin color or change in moles.  +dry skin   Hematologic/Lymphatic:   The patient denies easily bruising or bleeding or persiste nipple ,and areola appear normal. There is no skin dimpling with movement of the pectoralis. There is no nipple retraction. No nipple discharge can be elicited. The parenchyma is mildly nodular.  There are no dominant masses in the breast. The axillary tail family members that may be diagnosed with breast cancer. We then turned our discussion towards genetic counseling and testing as her likelihood for carrying a mutation is low. Based on the findings, I do not recommend BRCA testing.  We discussed the impli and reassured her that it would be safe for her to proceed with oral contraceptives with minimal increased breast cancer risk. She will discuss this with her midwife clinic.   The patient was given ample opportunity for questions, and those questions were

## 2020-08-27 ENCOUNTER — PATIENT MESSAGE (OUTPATIENT)
Dept: OBGYN CLINIC | Facility: CLINIC | Age: 40
End: 2020-08-27

## 2020-08-27 ENCOUNTER — OFFICE VISIT (OUTPATIENT)
Dept: OBGYN CLINIC | Facility: CLINIC | Age: 40
End: 2020-08-27
Payer: COMMERCIAL

## 2020-08-27 VITALS
DIASTOLIC BLOOD PRESSURE: 77 MMHG | SYSTOLIC BLOOD PRESSURE: 132 MMHG | BODY MASS INDEX: 27.78 KG/M2 | HEART RATE: 69 BPM | HEIGHT: 67 IN | WEIGHT: 177 LBS

## 2020-08-27 DIAGNOSIS — Z31.69 PRE-CONCEPTION COUNSELING: Primary | ICD-10-CM

## 2020-08-27 DIAGNOSIS — Z32.02 PREGNANCY EXAMINATION OR TEST, NEGATIVE RESULT: ICD-10-CM

## 2020-08-27 LAB
CONTROL LINE PRESENT WITH A CLEAR BACKGROUND (YES/NO): YES YES/NO
KIT LOT #: NORMAL NUMERIC
PREGNANCY TEST, URINE: NEGATIVE

## 2020-08-27 PROCEDURE — 3078F DIAST BP <80 MM HG: CPT | Performed by: ADVANCED PRACTICE MIDWIFE

## 2020-08-27 PROCEDURE — 81025 URINE PREGNANCY TEST: CPT | Performed by: ADVANCED PRACTICE MIDWIFE

## 2020-08-27 PROCEDURE — 3075F SYST BP GE 130 - 139MM HG: CPT | Performed by: ADVANCED PRACTICE MIDWIFE

## 2020-08-27 PROCEDURE — 3008F BODY MASS INDEX DOCD: CPT | Performed by: ADVANCED PRACTICE MIDWIFE

## 2020-08-27 PROCEDURE — 99214 OFFICE O/P EST MOD 30 MIN: CPT | Performed by: ADVANCED PRACTICE MIDWIFE

## 2020-08-31 DIAGNOSIS — Z80.3 FAMILY HISTORY OF BREAST CANCER: Primary | ICD-10-CM

## 2020-08-31 DIAGNOSIS — Z91.89 AT HIGH RISK FOR BREAST CANCER: ICD-10-CM

## 2020-08-31 NOTE — PROGRESS NOTES
HPI:    Patient ID: Nichole Nolan is a 44year old female who presents for fertility awareness counseling. Pt is a . Pt desires pregnancy. HPI    Review of Systems   Constitutional: Negative. Genitourinary: Negative.     Musculoskeletal: N advised.     Medical Complications    Women 28years of age or older can expect to experience two to three fold higher rates of hospitalization,  delivery, and pregnancy-related complications when compared to their younger counterparts.  The two mo diaphragmatic hernia appear to occur with increased frequency in offspring of older women.  These abnormalities are structural and unrelated to aneuploidy, thus they would not be detected by karyotype analysis.  For these reasons a complete, detailed ultras

## 2020-09-13 ENCOUNTER — HOSPITAL ENCOUNTER (OUTPATIENT)
Dept: MRI IMAGING | Facility: HOSPITAL | Age: 40
Discharge: HOME | End: 2020-09-13
Attending: SURGERY
Payer: COMMERCIAL

## 2020-09-13 DIAGNOSIS — Z91.89 AT HIGH RISK FOR BREAST CANCER: ICD-10-CM

## 2020-09-13 DIAGNOSIS — Z80.3 FAMILY HISTORY OF BREAST CANCER: ICD-10-CM

## 2020-09-13 PROCEDURE — A9575 INJ GADOTERATE MEGLUMI 0.1ML: HCPCS | Performed by: SURGERY

## 2020-09-13 PROCEDURE — 77049 MRI BREAST C-+ W/CAD BI: CPT | Performed by: SURGERY

## 2020-10-05 ENCOUNTER — TELEPHONE (OUTPATIENT)
Dept: OBGYN CLINIC | Facility: CLINIC | Age: 40
End: 2020-10-05

## 2020-10-05 ENCOUNTER — OFFICE VISIT (OUTPATIENT)
Dept: OBGYN CLINIC | Facility: CLINIC | Age: 40
End: 2020-10-05
Payer: COMMERCIAL

## 2020-10-05 ENCOUNTER — LAB ENCOUNTER (OUTPATIENT)
Dept: LAB | Facility: HOSPITAL | Age: 40
End: 2020-10-05
Attending: ADVANCED PRACTICE MIDWIFE
Payer: COMMERCIAL

## 2020-10-05 VITALS
HEART RATE: 70 BPM | DIASTOLIC BLOOD PRESSURE: 69 MMHG | BODY MASS INDEX: 29 KG/M2 | WEIGHT: 182.81 LBS | SYSTOLIC BLOOD PRESSURE: 113 MMHG

## 2020-10-05 DIAGNOSIS — Z32.00 PREGNANCY EXAMINATION OR TEST, PREGNANCY UNCONFIRMED: ICD-10-CM

## 2020-10-05 DIAGNOSIS — Z32.00 PREGNANCY EXAMINATION OR TEST, PREGNANCY UNCONFIRMED: Primary | ICD-10-CM

## 2020-10-05 PROCEDURE — 83002 ASSAY OF GONADOTROPIN (LH): CPT

## 2020-10-05 PROCEDURE — 36415 COLL VENOUS BLD VENIPUNCTURE: CPT

## 2020-10-05 PROCEDURE — 81025 URINE PREGNANCY TEST: CPT | Performed by: ADVANCED PRACTICE MIDWIFE

## 2020-10-05 PROCEDURE — 3074F SYST BP LT 130 MM HG: CPT | Performed by: ADVANCED PRACTICE MIDWIFE

## 2020-10-05 PROCEDURE — 84144 ASSAY OF PROGESTERONE: CPT

## 2020-10-05 PROCEDURE — 99213 OFFICE O/P EST LOW 20 MIN: CPT | Performed by: ADVANCED PRACTICE MIDWIFE

## 2020-10-05 PROCEDURE — 3078F DIAST BP <80 MM HG: CPT | Performed by: ADVANCED PRACTICE MIDWIFE

## 2020-10-05 PROCEDURE — 84702 CHORIONIC GONADOTROPIN TEST: CPT

## 2020-10-05 PROCEDURE — 83001 ASSAY OF GONADOTROPIN (FSH): CPT

## 2020-10-05 RX ORDER — MEDROXYPROGESTERONE ACETATE 10 MG/1
10 TABLET ORAL DAILY
Qty: 5 TABLET | Refills: 0 | Status: SHIPPED | OUTPATIENT
Start: 2020-10-05 | End: 2020-10-10

## 2020-10-05 NOTE — TELEPHONE ENCOUNTER
Pt states she is on day 37 of her cycle. Usually 28 days. Neg hptx2. Concerned about ectopic. Denies any pain. Advised pt that it is not uncommon to skip a month or have some irregularity. Instructed to take another hpt next week.  If she does not get her p

## 2020-10-05 NOTE — TELEPHONE ENCOUNTER
Pt. States that she is on day 37 of her menstrual cycle. Pt. Wants to know if she needs to come in to be checked out? Pt. States that she is negative for pregnancy.

## 2020-10-14 ENCOUNTER — TELEPHONE (OUTPATIENT)
Dept: OBGYN CLINIC | Facility: CLINIC | Age: 40
End: 2020-10-14

## 2020-10-14 NOTE — TELEPHONE ENCOUNTER
Pt states she took last pill of progesterone on 10/10 and still has not gotten a period. Pt advised it can take up to 7 days after last pill of progesterone to get period.  Pt advised to call back if she has not gotten a period after a week of stopping prog

## 2020-10-29 NOTE — PROGRESS NOTES
HPI:   Shiv Rios is a 36year old female who presents for a gyne visit trying for pregnancy , period is late and neg pregnancy tests x2 at home. Has had stress lately.    Wt Readings from Last 3 Encounters:  10/05/20 : 182 lb 12.8 oz (82.9 kg)  0 unusual skin lesions  BREAST: denies masses, discharge or pain; reports self breast exam   GI: denies abdominal pain, nausea or vomiting  : denies urinary complaints, vaginal discharge or itching, dyspareunia, reports periods regular usually    MUSCULOSK

## 2020-11-13 ENCOUNTER — TELEPHONE (OUTPATIENT)
Dept: OBGYN CLINIC | Facility: CLINIC | Age: 40
End: 2020-11-13

## 2020-11-13 ENCOUNTER — LAB ENCOUNTER (OUTPATIENT)
Dept: LAB | Facility: REFERENCE LAB | Age: 40
End: 2020-11-13
Attending: ADVANCED PRACTICE MIDWIFE
Payer: COMMERCIAL

## 2020-11-13 DIAGNOSIS — N92.6 MISSED MENSES: Primary | ICD-10-CM

## 2020-11-13 DIAGNOSIS — N92.6 MISSED MENSES: ICD-10-CM

## 2020-11-13 PROCEDURE — 84144 ASSAY OF PROGESTERONE: CPT

## 2020-11-13 PROCEDURE — 36415 COLL VENOUS BLD VENIPUNCTURE: CPT

## 2020-11-13 PROCEDURE — 84702 CHORIONIC GONADOTROPIN TEST: CPT

## 2020-11-13 NOTE — TELEPHONE ENCOUNTER
Per [facility], labs can be ordered but she recommends waiting a few days. Orders placed & pt notified of [facility]ES recs, that results are released immediately in MyChart so she may see the results before the provider & that she needs to schedule a lab appt.  Pt verbali

## 2020-11-13 NOTE — TELEPHONE ENCOUNTER
Pt states she is 37 y/o & attempting to conceive. Today is day 29 of her cycle & she reports neg HPTx2, fatigue, increased temp (not febrile) & breast tenderness.  Wants to be proactive & asking if she should schedule office appt for preg test or have blood

## 2020-11-14 ENCOUNTER — TELEPHONE (OUTPATIENT)
Dept: OBGYN CLINIC | Facility: CLINIC | Age: 40
End: 2020-11-14

## 2020-11-14 NOTE — TELEPHONE ENCOUNTER
Pt advised of negative HCG. Pt would like to know what next steps are in trying to conceive. Pt wondering if she should see LUTHER or what else she can try. Routed to [facility] for rec's. Also see pt's MyChart message.

## 2020-11-14 NOTE — TELEPHONE ENCOUNTER
----- Message from Mesha Sarabia CNM sent at 11/13/2020 11:24 PM CST -----  Please call HCG is negative

## 2020-11-17 ENCOUNTER — OFFICE VISIT (OUTPATIENT)
Dept: OBGYN CLINIC | Facility: CLINIC | Age: 40
End: 2020-11-17
Payer: COMMERCIAL

## 2020-11-17 VITALS
BODY MASS INDEX: 29 KG/M2 | HEART RATE: 74 BPM | DIASTOLIC BLOOD PRESSURE: 86 MMHG | SYSTOLIC BLOOD PRESSURE: 133 MMHG | WEIGHT: 187.19 LBS

## 2020-11-17 DIAGNOSIS — N97.9 FEMALE FERTILITY PROBLEM: Primary | ICD-10-CM

## 2020-11-17 PROCEDURE — 99401 PREV MED CNSL INDIV APPRX 15: CPT | Performed by: ADVANCED PRACTICE MIDWIFE

## 2020-11-17 PROCEDURE — 3079F DIAST BP 80-89 MM HG: CPT | Performed by: ADVANCED PRACTICE MIDWIFE

## 2020-11-17 PROCEDURE — 3075F SYST BP GE 130 - 139MM HG: CPT | Performed by: ADVANCED PRACTICE MIDWIFE

## 2020-11-17 RX ORDER — GLYCERIN/MINERAL OIL
LOTION (ML) TOPICAL
COMMUNITY

## 2020-11-17 NOTE — PROGRESS NOTES
Patient presents with:  Consult: discuss Hormone. Trying to conceive. Denies pain. Last menses - With quarter size clots. HPI:   Regulo Angulo is 36year old [id number], here today with concern for difficulty conceiving and irregular menses.  She has already 500 MG Oral Tab Take 1 tablet (500 mg total) by mouth every 12 (twelve) hours. 60 tablet 5       Allergies:  No Known Allergies    ROS:  Review of Systems   Constitutional: Positive for unexpected weight change (11lb weight gain since June).  Negative for a adequate fresh fruits and vegetables  Benefits of daily exercise    Patient verbalized understanding, All questions answered.  No barriers to learning identified

## 2020-11-17 NOTE — PATIENT INSTRUCTIONS
Understanding Fertility Problems: Assisted Reproduction  Your doctor has suggested assisted reproduction technologies (ART). ART may be needed for certain fertility problems. Or it may be used if other treatments haven’t helped.  In most cases, these meth The child may be conceived with her egg and your partner’s sperm. This is called traditional surrogacy. Or the baby may be conceived with your egg and your partner’s sperm. This is called gestational surrogacy.  Surrogacy can have legal and emotional aspect This information is not intended as a substitute for professional medical care. Always follow your healthcare professional's instructions.         Understanding Fertility Problems: Improving Ovulation with Medicine  A woman’s fertility depends on her abilit Pills Hot flashes, blurred vision, breast tenderness, nausea, mood swings, ovarian cysts, and increased chances of having twins   Follicle stimulating hormone or FSH Stimulates the ovaries to produce more mature eggs Injections Increased chance of multiple partner. Take advantage of help from your healthcare provider, family, or support groups. And remember that no matter what happens, you and your partner can still look forward to a rewarding life together.   Getting support  If your stress and anxiety feel like a weekend vacation can go a long way toward easing your stress. Thinking things over  If you’ve been through a long period of treatment, consider taking a break to think things over.  Just a month off can help relieve some of the pressure you may be f is a major factor in female fertility. As a woman ages, the quantity and quality of her eggs decline. This becomes apparent by around age 28 and speeds up after age 40. A man makes sperm throughout his life. So age is not as much of a factor.  But many prob procedures. These allow him or her to look at your reproductive organs. Health history  Your healthcare provider will ask about your health and lifestyle. You’ll also be asked about factors that can affect your ability to get pregnant.  This might be ho to see problems on the X-rays. · Ultrasound. This uses painless sound waves to make images of internal organs. This can help show problems with the ovaries or uterine lining.   · Sonohysterogram. This is an ultrasound done with sterile saltwater (saline) s infection. This can be treated with antibiotics. Treating the fallopian tubes  There may be a problem in the fallopian tubes. This can stop sperm from reaching an egg. The treatment depends on the cause.  Causes include:  · A tubal blockage near the uterus has the best chance of success. Your healthcare provider may also advise working on other factors that can affect fertility. Predicting ovulation  Conception is only possible when a woman is ovulating. One signal of ovulation is a surge in the hormone LH. your healthcare provider about any substances you take. Sexually transmitted diseases  Sexually transmitted diseases (STDs) can scar the reproductive organs in both men and women. If Darliss Cheadle had an STD, be sure to tell your healthcare provider.   Testicular

## 2021-01-08 DIAGNOSIS — Z80.3 FAMILY HISTORY OF BREAST CANCER IN FIRST DEGREE RELATIVE: ICD-10-CM

## 2021-01-08 DIAGNOSIS — Z12.31 ENCOUNTER FOR SCREENING MAMMOGRAM FOR MALIGNANT NEOPLASM OF BREAST: Primary | ICD-10-CM

## 2021-03-01 NOTE — PROGRESS NOTES
High Risk Breast Cancer Screening and Prevention Clinic    History of Present Illness:   49-year-old woman who returns for follow-up related to her high risk of developing breast cancer secondary to her family history, which is detailed below.   She denies Aunt with breast cancer in her 63's,  in her 63's. Maternal third cousin with breast cancer at 39,  at the age of 48.    Maternal cousin with breast cancer at 61, alive   Paternal grandfather with prostate cancer at the age of 79, currentl discharge. Skin:   The patient denies rash, itching, skin lesions, change in skin color or change in moles. +dry skin   Hematologic/Lymphatic:   The patient denies easily bruising or bleeding or persistent swollen glands or lymph nodes.    Musculoskeletal areola appear normal. There is no skin dimpling with movement of the pectoralis. There is no nipple retraction. No nipple discharge can be elicited. The parenchyma is mildly nodular.  There are no dominant masses in the breast. The axillary tail is normal. that may be diagnosed with breast cancer. We then turned our discussion towards genetic counseling and testing as her likelihood for carrying a mutation is low. Based on the findings, I do not recommend BRCA testing.  We discussed the implications of anguiano reproductive endocrinology. We discussed that infertility treatments have not been associated with a direct cost of breast cancer.   I personally reviewed her most recent MRI with her and explained that a screening mammogram will be done in June 2021 jaxon

## 2021-03-03 ENCOUNTER — OFFICE VISIT (OUTPATIENT)
Dept: SURGERY | Facility: CLINIC | Age: 41
End: 2021-03-03
Payer: COMMERCIAL

## 2021-03-03 VITALS
BODY MASS INDEX: 27.47 KG/M2 | RESPIRATION RATE: 20 BRPM | TEMPERATURE: 98 F | HEIGHT: 67 IN | HEART RATE: 65 BPM | SYSTOLIC BLOOD PRESSURE: 125 MMHG | DIASTOLIC BLOOD PRESSURE: 60 MMHG | OXYGEN SATURATION: 99 % | WEIGHT: 175 LBS

## 2021-03-03 DIAGNOSIS — Z80.3 FAMILY HISTORY OF BREAST CANCER IN FIRST DEGREE RELATIVE: ICD-10-CM

## 2021-03-03 DIAGNOSIS — N60.19 FIBROCYSTIC BREAST DISEASE (FCBD), UNSPECIFIED LATERALITY: Primary | ICD-10-CM

## 2021-03-03 PROCEDURE — 99213 OFFICE O/P EST LOW 20 MIN: CPT | Performed by: SURGERY

## 2021-03-03 PROCEDURE — 3074F SYST BP LT 130 MM HG: CPT | Performed by: SURGERY

## 2021-03-03 PROCEDURE — 3008F BODY MASS INDEX DOCD: CPT | Performed by: SURGERY

## 2021-03-03 PROCEDURE — 3078F DIAST BP <80 MM HG: CPT | Performed by: SURGERY

## 2021-06-17 ENCOUNTER — HOSPITAL ENCOUNTER (OUTPATIENT)
Dept: MAMMOGRAPHY | Facility: HOSPITAL | Age: 41
Discharge: HOME | End: 2021-06-17
Attending: PHYSICIAN ASSISTANT
Payer: COMMERCIAL

## 2021-06-17 DIAGNOSIS — Z80.3 FAMILY HISTORY OF BREAST CANCER IN FIRST DEGREE RELATIVE: ICD-10-CM

## 2021-06-17 DIAGNOSIS — Z12.31 ENCOUNTER FOR SCREENING MAMMOGRAM FOR MALIGNANT NEOPLASM OF BREAST: ICD-10-CM

## 2021-06-17 PROCEDURE — 77067 SCR MAMMO BI INCL CAD: CPT | Performed by: PHYSICIAN ASSISTANT

## 2021-06-17 PROCEDURE — 77063 BREAST TOMOSYNTHESIS BI: CPT | Performed by: PHYSICIAN ASSISTANT

## 2021-06-18 ENCOUNTER — HOSPITAL ENCOUNTER (OUTPATIENT)
Dept: MAMMOGRAPHY | Facility: HOSPITAL | Age: 41
Discharge: HOME | End: 2021-06-18
Attending: PHYSICIAN ASSISTANT
Payer: COMMERCIAL

## 2021-06-18 DIAGNOSIS — R92.2 INCONCLUSIVE MAMMOGRAM: ICD-10-CM

## 2021-06-18 PROCEDURE — 77061 BREAST TOMOSYNTHESIS UNI: CPT | Performed by: PHYSICIAN ASSISTANT

## 2021-06-18 PROCEDURE — 77065 DX MAMMO INCL CAD UNI: CPT | Performed by: PHYSICIAN ASSISTANT

## 2021-06-18 PROCEDURE — 76642 ULTRASOUND BREAST LIMITED: CPT | Performed by: PHYSICIAN ASSISTANT

## 2021-07-12 RX ORDER — VALACYCLOVIR HYDROCHLORIDE 1 G/1
TABLET, FILM COATED ORAL
Qty: 20 TABLET | Refills: 0 | Status: SHIPPED | OUTPATIENT
Start: 2021-07-12 | End: 2022-01-26 | Stop reason: ALTCHOICE

## 2021-07-12 NOTE — TELEPHONE ENCOUNTER
Message noted: Chart reviewed and may refill medication as requested times one. Prescription sent to listed pharmacy. Pharmacy to notify patient.    Pt notified through [facility]

## 2021-08-12 ENCOUNTER — OFFICE VISIT (OUTPATIENT)
Dept: OBGYN CLINIC | Facility: CLINIC | Age: 41
End: 2021-08-12
Payer: COMMERCIAL

## 2021-08-12 VITALS — SYSTOLIC BLOOD PRESSURE: 126 MMHG | DIASTOLIC BLOOD PRESSURE: 62 MMHG | WEIGHT: 178.63 LBS | BODY MASS INDEX: 28 KG/M2

## 2021-08-12 DIAGNOSIS — N97.8 FEMALE INFERTILITY DUE TO ADVANCED MATERNAL AGE: ICD-10-CM

## 2021-08-12 DIAGNOSIS — N97.9 FEMALE INFERTILITY: Primary | ICD-10-CM

## 2021-08-12 PROCEDURE — 3078F DIAST BP <80 MM HG: CPT | Performed by: OBSTETRICS & GYNECOLOGY

## 2021-08-12 PROCEDURE — 3074F SYST BP LT 130 MM HG: CPT | Performed by: OBSTETRICS & GYNECOLOGY

## 2021-08-12 PROCEDURE — 99213 OFFICE O/P EST LOW 20 MIN: CPT | Performed by: OBSTETRICS & GYNECOLOGY

## 2021-08-12 NOTE — PROGRESS NOTES
HPI/Subjective:   Patient ID: Sallie Kim is a 36year old female.     HPI  Gynecology consultation  Referred by fertility specialist Dr. Patrici Ganser  Patient is a 15-year-old  4 para 3-0-1-3 with a history of a  section with her second PRENATAL VITAMINS) 28-0.8 MG Oral Tab Take by mouth. • ibuprofen 200 MG Oral Tab Take 200 mg by mouth every 6 (six) hours as needed for Pain.  (Patient not taking: Reported on 8/12/2021 )       Allergies:No Known Allergies    Objective:   Physical Exam

## 2021-09-01 ENCOUNTER — LAB REQUISITION (OUTPATIENT)
Dept: LAB | Facility: HOSPITAL | Age: 41
End: 2021-09-01
Payer: COMMERCIAL

## 2021-09-01 DIAGNOSIS — N84.0 POLYP OF CORPUS UTERI: ICD-10-CM

## 2021-09-01 PROCEDURE — 88305 TISSUE EXAM BY PATHOLOGIST: CPT | Performed by: SPECIALIST

## 2021-12-15 ENCOUNTER — TELEPHONE (OUTPATIENT)
Dept: SURGERY | Facility: CLINIC | Age: 41
End: 2021-12-15

## 2021-12-15 ENCOUNTER — LAB ENCOUNTER (OUTPATIENT)
Dept: LAB | Facility: HOSPITAL | Age: 41
End: 2021-12-15
Attending: NURSE PRACTITIONER
Payer: COMMERCIAL

## 2021-12-15 DIAGNOSIS — R30.0 DYSURIA: ICD-10-CM

## 2021-12-15 DIAGNOSIS — R30.0 DYSURIA: Primary | ICD-10-CM

## 2021-12-15 PROCEDURE — 87086 URINE CULTURE/COLONY COUNT: CPT

## 2021-12-15 PROCEDURE — 81001 URINALYSIS AUTO W/SCOPE: CPT

## 2021-12-15 RX ORDER — CIPROFLOXACIN 500 MG/1
500 TABLET, FILM COATED ORAL EVERY 12 HOURS
Qty: 10 TABLET | Refills: 0 | Status: SHIPPED | OUTPATIENT
Start: 2021-12-15 | End: 2021-12-20

## 2021-12-15 NOTE — TELEPHONE ENCOUNTER
Pt is having symptoms of uti. Pt wants an order to be tested. Pt is taking rx. Levothyroxine.   Please call pt

## 2021-12-15 NOTE — TELEPHONE ENCOUNTER
S/W pt and determined that she burning with urination, urinary frequency and urgency, & cloudy urine. Denies foul smell to urine hematuria or fever and chills. States that she had UTI's in the past and it turned into a kidney infection.  I told her that I c

## 2021-12-20 ENCOUNTER — OFFICE VISIT (OUTPATIENT)
Dept: ENDOCRINOLOGY CLINIC | Facility: CLINIC | Age: 41
End: 2021-12-20
Payer: COMMERCIAL

## 2021-12-20 VITALS
HEART RATE: 71 BPM | RESPIRATION RATE: 18 BRPM | WEIGHT: 175 LBS | DIASTOLIC BLOOD PRESSURE: 65 MMHG | SYSTOLIC BLOOD PRESSURE: 128 MMHG | BODY MASS INDEX: 27.47 KG/M2 | HEIGHT: 67 IN

## 2021-12-20 DIAGNOSIS — R94.6 ABNORMAL THYROID FUNCTION TEST: Primary | ICD-10-CM

## 2021-12-20 PROCEDURE — 3008F BODY MASS INDEX DOCD: CPT | Performed by: INTERNAL MEDICINE

## 2021-12-20 PROCEDURE — 3074F SYST BP LT 130 MM HG: CPT | Performed by: INTERNAL MEDICINE

## 2021-12-20 PROCEDURE — 3078F DIAST BP <80 MM HG: CPT | Performed by: INTERNAL MEDICINE

## 2021-12-20 PROCEDURE — 99204 OFFICE O/P NEW MOD 45 MIN: CPT | Performed by: INTERNAL MEDICINE

## 2021-12-20 RX ORDER — LEVOTHYROXINE SODIUM 0.03 MG/1
25 TABLET ORAL
Qty: 90 TABLET | Refills: 0 | Status: SHIPPED | OUTPATIENT
Start: 2021-12-20 | End: 2022-03-20

## 2021-12-20 RX ORDER — LEVOTHYROXINE SODIUM 0.03 MG/1
25 TABLET ORAL EVERY MORNING
COMMUNITY

## 2021-12-20 NOTE — H&P
Name: Kacie Kirk  Date: 12/20/2021    Referring Physician: No ref. provider found    Patient presents with:  Consult: for management of thyroid medication during fertility treatment.        HISTORY OF PRESENT ILLNESS   Yuvaljeff Kirk is a 39 yea Oral Tab, Take 1 tablet (500 mg total) by mouth every 12 (twelve) hours for 5 days. , Disp: 10 tablet, Rfl: 0  •  Prenatal Vit-Fe Fumarate-FA ( PRENATAL VITAMINS) 28-0.8 MG Oral Tab, Take by mouth., Disp: , Rfl:   •  VALACYCLOVIR HCL 1 G Oral Tab, TAKE ON loss    Labs:  Date  TSH  FT4  04/09/16 0.99  12/29/19 2.140  01/07/21 2.02  11/22/21 3.04  1.31    Imaging:  None    ASSESSMENT/PLAN: This is a 39year-old woman here for evaluation and management of abnormal thyroid function tests in the setting of start

## 2021-12-21 ENCOUNTER — TELEPHONE (OUTPATIENT)
Dept: SURGERY | Facility: CLINIC | Age: 41
End: 2021-12-21

## 2021-12-21 NOTE — TELEPHONE ENCOUNTER
Pt called back. Notified her that I spoke with RA and that cipro will not cause rectal bleeding. Asked her to if she was positive it is not vaginal bleeding and pt said it was not.  Advised pt to follow up with pcp in regards to rectal bleeding and if she

## 2021-12-21 NOTE — TELEPHONE ENCOUNTER
Pt states she noticed rectal bleeding today while going to the bathroom. Denies diarrhea, denies constipation, denies vaginal bleeding, and denies pain. States she does not feel it is a hemorrhoid because does not have hx of it and no rectal pain.  States i

## 2021-12-27 ENCOUNTER — OFFICE VISIT (OUTPATIENT)
Dept: SURGERY | Facility: CLINIC | Age: 41
End: 2021-12-27
Payer: COMMERCIAL

## 2021-12-27 DIAGNOSIS — N39.0 FREQUENT UTI: Primary | ICD-10-CM

## 2021-12-27 PROCEDURE — 99213 OFFICE O/P EST LOW 20 MIN: CPT | Performed by: NURSE PRACTITIONER

## 2021-12-27 NOTE — PROGRESS NOTES
HPI:    Patient ID: Sophie Keene is a 39year old female. HPI     Patient is a 39year old female who presents to the clinic for a follow up regarding UTI. No significant past medical history. Patient previously seen in May 2020.   She has a hi shingles 2012   • Oral herpes age 21   • Tendinitis       Past Surgical History:   Procedure Laterality Date   •      • HAND/FINGER SURGERY UNLISTED Left       Family History   Problem Relation Age of Onset   • Breast Cancer Maternal Aunt now.  If she develops symptoms of a UTI she will contact the clinic for nurse driven UTI protocol. No orders of the defined types were placed in this encounter.       Meds This Visit:  Requested Prescriptions      No prescriptions requested o

## 2022-01-06 ENCOUNTER — TELEPHONE (OUTPATIENT)
Dept: SURGERY | Facility: CLINIC | Age: 42
End: 2022-01-06

## 2022-01-06 RX ORDER — FLUCONAZOLE 150 MG/1
150 TABLET ORAL ONCE
Qty: 1 TABLET | Refills: 0 | Status: SHIPPED | OUTPATIENT
Start: 2022-01-06 | End: 2022-01-06

## 2022-01-06 RX ORDER — FLUCONAZOLE 150 MG/1
150 TABLET ORAL ONCE
Qty: 2 TABLET | Refills: 0 | Status: SHIPPED | OUTPATIENT
Start: 2022-01-06 | End: 2022-01-06

## 2022-01-06 NOTE — TELEPHONE ENCOUNTER
Noted that symptoms of UTI have subsided and pt asking for [address] for yeast infection from the ABX tx that she finish recently. Tasked to [facility], please advise.

## 2022-01-06 NOTE — TELEPHONE ENCOUNTER
Called pt and notified her of prescription sent to pharmacy. Pt asking if she can have 2 doses because she states sometimes 1 dose does not work for her. Routed to [facility];  Please advise.

## 2022-01-06 NOTE — TELEPHONE ENCOUNTER
S/W pt and informed her that [facility] sent the Fluconazole to her [address].

## 2022-01-06 NOTE — TELEPHONE ENCOUNTER
LMTCB.           Nancy Hung, APRN   12/17/2021  1:35 PM CST Back to Top        Vasile Ayon,     Your culture grew a very small amount of bacteria, not sufficient enough to check sensitivities.  Please complete the course of antibiotics that you were

## 2022-01-06 NOTE — TELEPHONE ENCOUNTER
Pt states her UTI sx have subsided but now she has a yeast infection - that's why she needs rx for Diflucan

## 2022-01-06 NOTE — TELEPHONE ENCOUNTER
Pt. States that follow up after taking the Cipro med she has a vaginal infection and is requesting to get a  2 day supply of Diflucan.

## 2022-01-07 NOTE — TELEPHONE ENCOUNTER
Pt called stating error in the rx.  [facility] only received one. Should have been two. Please send.   Pt advised office is closed for the day and will return Monday 1-10-22 at 8:00am.

## 2022-01-10 NOTE — TELEPHONE ENCOUNTER
-I called [facility] & confirmed they recv'd a 2nd prescription for Fluconazole. They reported \"holding the med until the pt needed it. \"  -S/w pt; identity verified with name & .  -On 22, pt reported concern that a 2nd script for Fluco

## 2022-02-16 ENCOUNTER — TELEPHONE (OUTPATIENT)
Dept: SURGERY | Facility: HOSPITAL | Age: 42
End: 2022-02-16

## 2022-02-16 NOTE — TELEPHONE ENCOUNTER
Returned pt phone call regarding  mammogram results. She is having fertility treatments and wanted to know if there are are any additional readings for her mammogram. She was also curious if she needed an MRI exam.  Informed pt to give the fertility clinic copies of the reports from 6/17 and 6/18. Also that I will be reaching out to Dr. Zeinab Sabillon to see if she wants her to have an MRI before her fertility transfer in April 2022. Pt verbalized understanding. All questions were answered. Encouraged pt to call or MyChart message with any other questions or concerns.

## 2022-03-03 ENCOUNTER — HOSPITAL ENCOUNTER (OUTPATIENT)
Dept: MRI IMAGING | Facility: HOSPITAL | Age: 42
Discharge: HOME | End: 2022-03-03
Attending: SURGERY
Payer: COMMERCIAL

## 2022-03-03 DIAGNOSIS — N60.19 FIBROCYSTIC BREAST DISEASE (FCBD), UNSPECIFIED LATERALITY: ICD-10-CM

## 2022-03-03 DIAGNOSIS — R92.2 DENSE BREASTS: ICD-10-CM

## 2022-03-03 DIAGNOSIS — Z80.3 FAMILY HISTORY OF MALIGNANT NEOPLASM OF BREAST: ICD-10-CM

## 2022-03-03 DIAGNOSIS — Z80.3 FAMILY HISTORY OF BREAST CANCER IN FIRST DEGREE RELATIVE: ICD-10-CM

## 2022-03-03 PROCEDURE — 77049 MRI BREAST C-+ W/CAD BI: CPT

## 2022-03-03 PROCEDURE — A9575 INJ GADOTERATE MEGLUMI 0.1ML: HCPCS | Performed by: SURGERY

## 2022-03-04 ENCOUNTER — TELEPHONE (OUTPATIENT)
Dept: SURGERY | Facility: CLINIC | Age: 42
End: 2022-03-04

## 2022-03-04 NOTE — TELEPHONE ENCOUNTER
Left detailed message for patient regarding the findings on her MRI. Patient needs to come back for a right breast second look ultrasound to ensure there is no concerning pathology in the area of concern on her MRI. She was encouraged to contact the office back with questions and to aid in scheduling.

## 2022-03-07 ENCOUNTER — HOSPITAL ENCOUNTER (OUTPATIENT)
Dept: MAMMOGRAPHY | Facility: HOSPITAL | Age: 42
Discharge: HOME | End: 2022-03-07
Attending: SURGERY
Payer: COMMERCIAL

## 2022-03-07 DIAGNOSIS — R92.8 ABNORMAL MRI, BREAST: ICD-10-CM

## 2022-03-07 PROCEDURE — 76642 ULTRASOUND BREAST LIMITED: CPT | Performed by: SURGERY

## 2022-03-07 NOTE — IMAGING NOTE
This [facility] RN assisted Dr. Don Alva with recommendation for a right breast 1 site MRI guided biopsy for enhancement. Procedure reviewed and all questions answered. Emotional and educational support given. On the day of the biopsy, pt instructed to take Tylenol 1000mg PO, eat a light meal & bring or wear a sports bra. Post biopsy care also reviewed with pt to include NO lifting more than 5lbs, no exercising or housework (limit upper body movement) for 24-48 hrs post biopsy. Patient denies blood thinners, bleeding disorders, liver disease, chemo, and pregnancy. Pt verbalized understanding. Our breast center schedulers will be calling to schedule an appt that is convenient for pt.

## 2022-03-16 ENCOUNTER — HOSPITAL ENCOUNTER (OUTPATIENT)
Dept: MRI IMAGING | Facility: HOSPITAL | Age: 42
Discharge: HOME | End: 2022-03-16
Attending: SURGERY
Payer: COMMERCIAL

## 2022-03-16 ENCOUNTER — HOSPITAL ENCOUNTER (OUTPATIENT)
Dept: MAMMOGRAPHY | Facility: HOSPITAL | Age: 42
Discharge: HOME | End: 2022-03-16
Attending: SURGERY
Payer: COMMERCIAL

## 2022-03-16 DIAGNOSIS — R92.8 ABNORMAL MRI, BREAST: ICD-10-CM

## 2022-03-16 PROCEDURE — 19085 BX BREAST 1ST LESION MR IMAG: CPT | Performed by: SURGERY

## 2022-03-16 PROCEDURE — 77065 DX MAMMO INCL CAD UNI: CPT | Performed by: SURGERY

## 2022-03-16 PROCEDURE — 88305 TISSUE EXAM BY PATHOLOGIST: CPT | Performed by: SURGERY

## 2022-03-16 PROCEDURE — A9575 INJ GADOTERATE MEGLUMI 0.1ML: HCPCS | Performed by: SURGERY

## 2022-03-16 NOTE — IMAGING NOTE
Pt post right breast MRI biopsy. No bleeding or hematoma noted at the site. Steristrips placed and intact. Education and written discharge instructions provided. Verbalized understanding of all instructions. Questions addressed. Emotional support provided. RN escorted pt to mammography for post scan. Denies any pain and gait steady.

## 2022-04-13 ENCOUNTER — OFFICE VISIT (OUTPATIENT)
Dept: ENDOCRINOLOGY CLINIC | Facility: CLINIC | Age: 42
End: 2022-04-13
Payer: COMMERCIAL

## 2022-04-13 VITALS
HEART RATE: 97 BPM | BODY MASS INDEX: 28 KG/M2 | DIASTOLIC BLOOD PRESSURE: 88 MMHG | SYSTOLIC BLOOD PRESSURE: 135 MMHG | WEIGHT: 181 LBS

## 2022-04-13 DIAGNOSIS — E03.8 SUBCLINICAL HYPOTHYROIDISM: Primary | ICD-10-CM

## 2022-04-13 PROCEDURE — 3075F SYST BP GE 130 - 139MM HG: CPT | Performed by: INTERNAL MEDICINE

## 2022-04-13 PROCEDURE — 99213 OFFICE O/P EST LOW 20 MIN: CPT | Performed by: INTERNAL MEDICINE

## 2022-04-13 PROCEDURE — 3079F DIAST BP 80-89 MM HG: CPT | Performed by: INTERNAL MEDICINE

## 2022-06-07 ENCOUNTER — PATIENT MESSAGE (OUTPATIENT)
Dept: ENDOCRINOLOGY CLINIC | Facility: CLINIC | Age: 42
End: 2022-06-07

## 2022-06-07 DIAGNOSIS — E03.8 SUBCLINICAL HYPOTHYROIDISM: Primary | ICD-10-CM

## 2022-07-05 ENCOUNTER — TELEPHONE (OUTPATIENT)
Dept: ENDOCRINOLOGY CLINIC | Facility: CLINIC | Age: 42
End: 2022-07-05

## 2022-07-05 RX ORDER — LEVOTHYROXINE SODIUM 0.05 MG/1
50 TABLET ORAL
Qty: 90 TABLET | Refills: 1 | Status: SHIPPED | OUTPATIENT
Start: 2022-07-05

## 2022-07-05 NOTE — TELEPHONE ENCOUNTER
LOV: 04/13/22    Called patient to confirm if she wants generic levothyroxine or brand name synthroid. She confirmed generic levothyroxine is fine. RN refilled medication per protocol.

## 2022-07-05 NOTE — TELEPHONE ENCOUNTER
Pt called in to get prescription for synthroid 50 mg, this is the first time pt is getting this from Dr. Waldemar Chauhan. Pt is out of the medicine.  Please follow up

## 2022-07-27 ENCOUNTER — PATIENT MESSAGE (OUTPATIENT)
Dept: ENDOCRINOLOGY CLINIC | Facility: CLINIC | Age: 42
End: 2022-07-27

## 2022-07-27 NOTE — TELEPHONE ENCOUNTER
Please see mychart encounter 6/8/22 for information regarding labs and referral. Patient has been given information.

## 2022-11-17 RX ORDER — LEVOTHYROXINE SODIUM 0.05 MG/1
TABLET ORAL
Qty: 90 TABLET | Refills: 0 | Status: SHIPPED | OUTPATIENT
Start: 2022-11-17

## 2023-04-05 RX ORDER — LEVOTHYROXINE SODIUM 0.05 MG/1
50 TABLET ORAL
Qty: 90 TABLET | Refills: 0 | Status: SHIPPED | OUTPATIENT
Start: 2023-04-05

## 2023-04-05 NOTE — TELEPHONE ENCOUNTER
LOV 04/13/22. F/u 07/20/23. Pended 3 month supply. Patient also has labs from 06/08/22 still pending (Free T4, TSH). Would you like her to complete these now, or do them closer to the appointment?

## 2023-04-05 NOTE — TELEPHONE ENCOUNTER
Pt asking for refill for Levothyroxine  -she has appt for 7/20 - also asking if she needs to have blood work done first

## 2023-04-20 ENCOUNTER — HOSPITAL ENCOUNTER (OUTPATIENT)
Age: 43
Discharge: HOME | End: 2023-04-20
Payer: COMMERCIAL

## 2023-04-20 VITALS
OXYGEN SATURATION: 100 % | SYSTOLIC BLOOD PRESSURE: 129 MMHG | DIASTOLIC BLOOD PRESSURE: 87 MMHG | HEART RATE: 91 BPM | RESPIRATION RATE: 18 BRPM | TEMPERATURE: 98 F

## 2023-04-20 DIAGNOSIS — J06.9 URI WITH COUGH AND CONGESTION: Primary | ICD-10-CM

## 2023-04-20 DIAGNOSIS — J30.2 SEASONAL ALLERGIES: ICD-10-CM

## 2023-04-20 DIAGNOSIS — Z20.822 ENCOUNTER FOR LABORATORY TESTING FOR COVID-19 VIRUS: ICD-10-CM

## 2023-04-20 LAB
S PYO AG THROAT QL IA.RAPID: NEGATIVE
SARS-COV-2 RNA RESP QL NAA+PROBE: NOT DETECTED

## 2023-04-20 PROCEDURE — 99203 OFFICE O/P NEW LOW 30 MIN: CPT

## 2023-04-20 PROCEDURE — 87651 STREP A DNA AMP PROBE: CPT | Performed by: PHYSICIAN ASSISTANT

## 2023-04-20 PROCEDURE — 99212 OFFICE O/P EST SF 10 MIN: CPT

## 2023-04-20 RX ORDER — MONTELUKAST SODIUM 10 MG/1
TABLET ORAL
COMMUNITY
Start: 2023-04-06

## 2023-04-20 NOTE — ED INITIAL ASSESSMENT (HPI)
Patient arrived ambulatory to room c/o symptoms that started 3 days ago. +productive cough +sore throat. +headache. No fevers. No nasal congestion. No n/v/d. Easy non labored respirations.  No distress

## 2023-05-01 ENCOUNTER — OFFICE VISIT (OUTPATIENT)
Dept: OBGYN CLINIC | Facility: CLINIC | Age: 43
End: 2023-05-01

## 2023-05-01 VITALS
HEART RATE: 103 BPM | SYSTOLIC BLOOD PRESSURE: 127 MMHG | WEIGHT: 185 LBS | BODY MASS INDEX: 29 KG/M2 | DIASTOLIC BLOOD PRESSURE: 83 MMHG

## 2023-05-01 DIAGNOSIS — O99.210 OBESITY IN PREGNANCY: ICD-10-CM

## 2023-05-01 DIAGNOSIS — O09.521 ADVANCED MATERNAL AGE IN MULTIGRAVIDA, FIRST TRIMESTER: ICD-10-CM

## 2023-05-01 DIAGNOSIS — N92.6 MISSED MENSES: Primary | ICD-10-CM

## 2023-05-01 PROBLEM — Z87.59 HISTORY OF SHOULDER DYSTOCIA IN PRIOR PREGNANCY: Status: ACTIVE | Noted: 2023-05-01

## 2023-05-01 LAB
CONTROL LINE PRESENT WITH A CLEAR BACKGROUND (YES/NO): YES YES/NO
PREGNANCY TEST, URINE: POSITIVE

## 2023-05-01 PROCEDURE — 99213 OFFICE O/P EST LOW 20 MIN: CPT | Performed by: ADVANCED PRACTICE MIDWIFE

## 2023-05-01 PROCEDURE — 81025 URINE PREGNANCY TEST: CPT | Performed by: ADVANCED PRACTICE MIDWIFE

## 2023-05-01 PROCEDURE — 3079F DIAST BP 80-89 MM HG: CPT | Performed by: ADVANCED PRACTICE MIDWIFE

## 2023-05-01 PROCEDURE — 3074F SYST BP LT 130 MM HG: CPT | Performed by: ADVANCED PRACTICE MIDWIFE

## 2023-05-03 ENCOUNTER — HOSPITAL ENCOUNTER (OUTPATIENT)
Dept: ULTRASOUND IMAGING | Age: 43
Discharge: HOME | End: 2023-05-03
Attending: ADVANCED PRACTICE MIDWIFE
Payer: COMMERCIAL

## 2023-05-03 DIAGNOSIS — N92.6 MISSED MENSES: ICD-10-CM

## 2023-05-03 DIAGNOSIS — O09.521 ADVANCED MATERNAL AGE IN MULTIGRAVIDA, FIRST TRIMESTER: ICD-10-CM

## 2023-05-03 PROCEDURE — 76801 OB US < 14 WKS SINGLE FETUS: CPT | Performed by: ADVANCED PRACTICE MIDWIFE

## 2023-05-04 ENCOUNTER — TELEPHONE (OUTPATIENT)
Dept: OBGYN CLINIC | Facility: CLINIC | Age: 43
End: 2023-05-04

## 2023-05-04 NOTE — TELEPHONE ENCOUNTER
----- Message from Ambar Blackwell CNM sent at 5/4/2023 10:32 AM CDT -----  Please call patient. SIUP consistent with LMP. Thanks!

## 2023-05-15 ENCOUNTER — TELEPHONE (OUTPATIENT)
Dept: OBGYN CLINIC | Facility: CLINIC | Age: 43
End: 2023-05-15

## 2023-05-15 DIAGNOSIS — Z34.81 ENCOUNTER FOR SUPERVISION OF OTHER NORMAL PREGNANCY IN FIRST TRIMESTER: Primary | ICD-10-CM

## 2023-05-15 NOTE — TELEPHONE ENCOUNTER
Pt has new PN appt on 5/25. Wanted to know if order for ultrasound and bloodwork could be put in place prior due to booking and is having company coming soon and is having a hard time hiding things. Please call.

## 2023-05-16 NOTE — TELEPHONE ENCOUNTER
Pt Name and  verified. Pt would like to get FTS test done with [facility] and requesting order to be placed. Pt is trying to schedule as soon as possible before her Nurse Ed visit as she knows they get booked out fairly quickly. Order placed and pt provided with number to M.

## 2023-05-25 ENCOUNTER — NURSE ONLY (OUTPATIENT)
Dept: OBGYN CLINIC | Facility: CLINIC | Age: 43
End: 2023-05-25

## 2023-05-25 DIAGNOSIS — Z34.81 ENCOUNTER FOR SUPERVISION OF OTHER NORMAL PREGNANCY IN FIRST TRIMESTER: Primary | ICD-10-CM

## 2023-05-25 DIAGNOSIS — E07.9 THYROID DISORDER: ICD-10-CM

## 2023-05-25 DIAGNOSIS — O09.521 ADVANCED MATERNAL AGE IN MULTIGRAVIDA, FIRST TRIMESTER: ICD-10-CM

## 2023-05-25 NOTE — PROGRESS NOTES
Pt called today for RN OB Education    Missed menses apt with: Keri Saint  LMP: 3/13/2023    Pre  BMI: 28  EPDS score:   +UPT in office: 2023    US: 2023  Working ZAYDA: 2023  Hx of genetic abnormality in family: None  Hx of varicella: Yes in childhood    Covid Vaccine: Yes had 3 vaccines per pt     Consent (if needed): Interested in [address]    Sterilization/Contraception: Need to discuss further    OUD Screening: Pt. Has answered NO 5P questions and has NO  risk factors. Pt. Given What pregnant women need to know handout. Educational material reviewed with patient: Prenatal care, nutrition, weight gain recommendations, travel, exercise, intercourse, pregnancy changes, safe medications, pregnancy and work, fetal movement, labor and  labor, warning signs, food safety, tdap, cord blood, breastfeeding, circumcision, and Group B strep. Blood transfusion if needed: Yes  PN labs: Ordered including additional labs TSH, A1C, Vit D    Pt scheduled for FTS genetic ultrasound and desires to have NIPT sooner than her apt with [facility]. Req filled and left with kit up front. Spouce to  later today.        NOB apt: Made with Keri Saint

## 2023-05-27 ENCOUNTER — LAB ENCOUNTER (OUTPATIENT)
Dept: LAB | Facility: HOSPITAL | Age: 43
End: 2023-05-27
Attending: ADVANCED PRACTICE MIDWIFE
Payer: COMMERCIAL

## 2023-05-27 DIAGNOSIS — O09.521 ADVANCED MATERNAL AGE IN MULTIGRAVIDA, FIRST TRIMESTER: ICD-10-CM

## 2023-05-27 DIAGNOSIS — E07.9 THYROID DISORDER: ICD-10-CM

## 2023-05-27 DIAGNOSIS — Z34.81 ENCOUNTER FOR SUPERVISION OF OTHER NORMAL PREGNANCY IN FIRST TRIMESTER: ICD-10-CM

## 2023-05-27 LAB
ANTIBODY SCREEN: NEGATIVE
BASOPHILS # BLD AUTO: 0.03 X10(3) UL (ref 0–0.2)
BASOPHILS NFR BLD AUTO: 0.4 %
DEPRECATED RDW RBC AUTO: 41.1 FL (ref 35.1–46.3)
EOSINOPHIL # BLD AUTO: 0.1 X10(3) UL (ref 0–0.7)
EOSINOPHIL NFR BLD AUTO: 1.2 %
ERYTHROCYTE [DISTWIDTH] IN BLOOD BY AUTOMATED COUNT: 12.5 % (ref 11–15)
EST. AVERAGE GLUCOSE BLD GHB EST-MCNC: 88 MG/DL (ref 68–126)
HBA1C MFR BLD: 4.7 % (ref ?–5.7)
HBV SURFACE AG SER-ACNC: <0.1 [IU]/L
HBV SURFACE AG SERPL QL IA: NONREACTIVE
HCT VFR BLD AUTO: 37.5 %
HCV AB SERPL QL IA: NONREACTIVE
HGB BLD-MCNC: 13 G/DL
IMM GRANULOCYTES # BLD AUTO: 0.03 X10(3) UL (ref 0–1)
IMM GRANULOCYTES NFR BLD: 0.4 %
LYMPHOCYTES # BLD AUTO: 2.16 X10(3) UL (ref 1–4)
LYMPHOCYTES NFR BLD AUTO: 26.4 %
MCH RBC QN AUTO: 31.4 PG (ref 26–34)
MCHC RBC AUTO-ENTMCNC: 34.7 G/DL (ref 31–37)
MCV RBC AUTO: 90.6 FL
MONOCYTES # BLD AUTO: 0.42 X10(3) UL (ref 0.1–1)
MONOCYTES NFR BLD AUTO: 5.1 %
NEUTROPHILS # BLD AUTO: 5.45 X10 (3) UL (ref 1.5–7.7)
NEUTROPHILS # BLD AUTO: 5.45 X10(3) UL (ref 1.5–7.7)
NEUTROPHILS NFR BLD AUTO: 66.5 %
PLATELET # BLD AUTO: 235 10(3)UL (ref 150–450)
RBC # BLD AUTO: 4.14 X10(6)UL
RH BLOOD TYPE: NEGATIVE
RUBV IGG SER QL: POSITIVE
RUBV IGG SER-ACNC: 215.2 IU/ML (ref 10–?)
TSI SER-ACNC: 1.28 MIU/ML (ref 0.36–3.74)
VIT D+METAB SERPL-MCNC: 33.4 NG/ML (ref 30–100)
WBC # BLD AUTO: 8.2 X10(3) UL (ref 4–11)

## 2023-05-27 PROCEDURE — 87340 HEPATITIS B SURFACE AG IA: CPT

## 2023-05-27 PROCEDURE — 85025 COMPLETE CBC W/AUTO DIFF WBC: CPT

## 2023-05-27 PROCEDURE — 36415 COLL VENOUS BLD VENIPUNCTURE: CPT

## 2023-05-27 PROCEDURE — 86901 BLOOD TYPING SEROLOGIC RH(D): CPT

## 2023-05-27 PROCEDURE — 87086 URINE CULTURE/COLONY COUNT: CPT

## 2023-05-27 PROCEDURE — 86850 RBC ANTIBODY SCREEN: CPT

## 2023-05-27 PROCEDURE — 86780 TREPONEMA PALLIDUM: CPT

## 2023-05-27 PROCEDURE — 86787 VARICELLA-ZOSTER ANTIBODY: CPT

## 2023-05-27 PROCEDURE — 86900 BLOOD TYPING SEROLOGIC ABO: CPT

## 2023-05-27 PROCEDURE — 87389 HIV-1 AG W/HIV-1&-2 AB AG IA: CPT

## 2023-05-27 PROCEDURE — 82306 VITAMIN D 25 HYDROXY: CPT

## 2023-05-27 PROCEDURE — 84443 ASSAY THYROID STIM HORMONE: CPT

## 2023-05-27 PROCEDURE — 86803 HEPATITIS C AB TEST: CPT

## 2023-05-27 PROCEDURE — 86762 RUBELLA ANTIBODY: CPT

## 2023-05-27 PROCEDURE — 83036 HEMOGLOBIN GLYCOSYLATED A1C: CPT

## 2023-05-29 LAB
T PALLIDUM AB SER QL: NEGATIVE
VZV IGG SER IA-ACNC: 354.8 (ref 165–?)

## 2023-06-01 ENCOUNTER — TELEPHONE (OUTPATIENT)
Dept: OBGYN CLINIC | Facility: CLINIC | Age: 43
End: 2023-06-01

## 2023-06-01 NOTE — TELEPHONE ENCOUNTER
----- Message from Katharine Gonzalez CNM sent at 5/30/2023  9:09 AM CDT -----  Please call patient. NOB labs normal. She is Rh negative so will need rhogam at 28wks and if she experiences any bleeding before then. Thanks!

## 2023-06-05 ENCOUNTER — TELEPHONE (OUTPATIENT)
Dept: OBGYN CLINIC | Facility: CLINIC | Age: 43
End: 2023-06-05

## 2023-06-05 NOTE — TELEPHONE ENCOUNTER
Patients calling for her results from genetic testing completed on Tuesday. Please call at [phone],[facility]

## 2023-06-07 NOTE — TELEPHONE ENCOUNTER
Pt Name and  verified. Pt states that she would like to know if genetic testing has resulted. Pt  Advised that sharon testing usually takes 7-14 days. Advised that once results have been received, the MW will review and our office staff will notify her. Ata VÁSQUEZ.

## 2023-06-08 ENCOUNTER — INITIAL PRENATAL (OUTPATIENT)
Dept: OBGYN CLINIC | Facility: CLINIC | Age: 43
End: 2023-06-08

## 2023-06-08 VITALS
SYSTOLIC BLOOD PRESSURE: 113 MMHG | HEART RATE: 79 BPM | WEIGHT: 188 LBS | BODY MASS INDEX: 29 KG/M2 | DIASTOLIC BLOOD PRESSURE: 76 MMHG

## 2023-06-08 DIAGNOSIS — Z34.81 ENCOUNTER FOR SUPERVISION OF OTHER NORMAL PREGNANCY IN FIRST TRIMESTER: Primary | ICD-10-CM

## 2023-06-08 DIAGNOSIS — Z11.3 SCREEN FOR STD (SEXUALLY TRANSMITTED DISEASE): ICD-10-CM

## 2023-06-08 PROBLEM — E07.9 THYROID DISEASE: Status: ACTIVE | Noted: 2021-12-10

## 2023-06-08 PROCEDURE — 3078F DIAST BP <80 MM HG: CPT | Performed by: ADVANCED PRACTICE MIDWIFE

## 2023-06-08 PROCEDURE — 3074F SYST BP LT 130 MM HG: CPT | Performed by: ADVANCED PRACTICE MIDWIFE

## 2023-06-08 NOTE — PROGRESS NOTES
Kosta Coker is here with her  for NOB visit. Denies bleeding or pelvic pain. Recommended starting ASA at this time. She has hypothyroidism and is managed by endo. TSH WNL with NOB labs. History of  with second pregnancy due to fetal intolerance after ECV. Third baby had shoulder dystocia with no long term complications. Desires vaginal birth. Will be 43 at time of delivery. Understands recommendation for delivery by 40 weeks. Will desire membrane sweep prior. Physical exam WNL. Pap up to date. Has first trimester screen scheduled for next week. Reviewed warning signs and when to call.  SABA 4wks

## 2023-06-09 LAB
C TRACH DNA SPEC QL NAA+PROBE: NEGATIVE
N GONORRHOEA DNA SPEC QL NAA+PROBE: NEGATIVE

## 2023-06-09 NOTE — TELEPHONE ENCOUNTER
Incoming Fax received from [facility] with patient's Panorama test results. Sample collection date: 2023  Report date: 2023    Results: Low Risk   Low Risk for Aneuploidies and Microdeletions  Fetal Sex: Male  Fetal Fraction: 6.2 %         Pt Name and  verified. Patient informed and verbalized understanding.

## 2023-06-12 ENCOUNTER — HOSPITAL ENCOUNTER (OUTPATIENT)
Dept: PERINATAL CARE | Facility: HOSPITAL | Age: 43
End: 2023-06-12
Attending: ADVANCED PRACTICE MIDWIFE
Payer: COMMERCIAL

## 2023-06-12 ENCOUNTER — HOSPITAL ENCOUNTER (OUTPATIENT)
Dept: PERINATAL CARE | Facility: HOSPITAL | Age: 43
Discharge: HOME | End: 2023-06-12
Attending: OBSTETRICS & GYNECOLOGY
Payer: COMMERCIAL

## 2023-06-12 VITALS
SYSTOLIC BLOOD PRESSURE: 126 MMHG | BODY MASS INDEX: 29 KG/M2 | WEIGHT: 188 LBS | HEART RATE: 83 BPM | DIASTOLIC BLOOD PRESSURE: 70 MMHG

## 2023-06-12 DIAGNOSIS — E07.9 THYROID DISEASE: ICD-10-CM

## 2023-06-12 DIAGNOSIS — O09.291 HISTORY OF SHOULDER DYSTOCIA IN PRIOR PREGNANCY, CURRENTLY PREGNANT IN FIRST TRIMESTER: ICD-10-CM

## 2023-06-12 DIAGNOSIS — O09.521 MULTIGRAVIDA OF ADVANCED MATERNAL AGE IN FIRST TRIMESTER: ICD-10-CM

## 2023-06-12 DIAGNOSIS — Z36.9 FIRST TRIMESTER SCREENING: ICD-10-CM

## 2023-06-12 DIAGNOSIS — E07.9 THYROID DISEASE: Primary | ICD-10-CM

## 2023-06-12 PROCEDURE — 76813 OB US NUCHAL MEAS 1 GEST: CPT | Performed by: OBSTETRICS & GYNECOLOGY

## 2023-06-13 ENCOUNTER — PATIENT MESSAGE (OUTPATIENT)
Dept: OBGYN CLINIC | Facility: CLINIC | Age: 43
End: 2023-06-13

## 2023-06-13 DIAGNOSIS — Z34.81 ENCOUNTER FOR SUPERVISION OF OTHER NORMAL PREGNANCY IN FIRST TRIMESTER: Primary | ICD-10-CM

## 2023-06-17 ENCOUNTER — LAB ENCOUNTER (OUTPATIENT)
Dept: LAB | Facility: HOSPITAL | Age: 43
End: 2023-06-17
Attending: ADVANCED PRACTICE MIDWIFE
Payer: COMMERCIAL

## 2023-06-17 DIAGNOSIS — Z34.81 ENCOUNTER FOR SUPERVISION OF OTHER NORMAL PREGNANCY IN FIRST TRIMESTER: ICD-10-CM

## 2023-06-17 LAB — GLUCOSE 1H P GLC SERPL-MCNC: 108 MG/DL

## 2023-06-17 PROCEDURE — 82950 GLUCOSE TEST: CPT

## 2023-06-17 PROCEDURE — 36415 COLL VENOUS BLD VENIPUNCTURE: CPT

## 2023-06-21 ENCOUNTER — OFFICE VISIT (OUTPATIENT)
Dept: ENDOCRINOLOGY CLINIC | Facility: CLINIC | Age: 43
End: 2023-06-21

## 2023-06-21 VITALS
SYSTOLIC BLOOD PRESSURE: 112 MMHG | BODY MASS INDEX: 29 KG/M2 | WEIGHT: 184 LBS | DIASTOLIC BLOOD PRESSURE: 73 MMHG | HEART RATE: 93 BPM

## 2023-06-21 DIAGNOSIS — E03.8 HYPOTHYROIDISM DUE TO HASHIMOTO'S THYROIDITIS: Primary | ICD-10-CM

## 2023-06-21 DIAGNOSIS — E06.3 HYPOTHYROIDISM DUE TO HASHIMOTO'S THYROIDITIS: Primary | ICD-10-CM

## 2023-06-21 PROCEDURE — 3074F SYST BP LT 130 MM HG: CPT | Performed by: INTERNAL MEDICINE

## 2023-06-21 PROCEDURE — 3078F DIAST BP <80 MM HG: CPT | Performed by: INTERNAL MEDICINE

## 2023-06-21 PROCEDURE — 99213 OFFICE O/P EST LOW 20 MIN: CPT | Performed by: INTERNAL MEDICINE

## 2023-06-21 RX ORDER — LEVOTHYROXINE SODIUM 0.05 MG/1
50 TABLET ORAL
Qty: 90 TABLET | Refills: 3 | Status: SHIPPED | OUTPATIENT
Start: 2023-06-21

## 2023-06-26 ENCOUNTER — LAB ENCOUNTER (OUTPATIENT)
Dept: LAB | Facility: HOSPITAL | Age: 43
End: 2023-06-26
Attending: INTERNAL MEDICINE
Payer: COMMERCIAL

## 2023-06-26 DIAGNOSIS — E03.8 HYPOTHYROIDISM DUE TO HASHIMOTO'S THYROIDITIS: ICD-10-CM

## 2023-06-26 DIAGNOSIS — E06.3 HYPOTHYROIDISM DUE TO HASHIMOTO'S THYROIDITIS: Primary | ICD-10-CM

## 2023-06-26 DIAGNOSIS — E06.3 HYPOTHYROIDISM DUE TO HASHIMOTO'S THYROIDITIS: ICD-10-CM

## 2023-06-26 DIAGNOSIS — E03.8 HYPOTHYROIDISM DUE TO HASHIMOTO'S THYROIDITIS: Primary | ICD-10-CM

## 2023-06-26 LAB
T4 FREE SERPL-MCNC: 1 NG/DL (ref 0.8–1.7)
TSI SER-ACNC: 1.81 MIU/ML (ref 0.36–3.74)

## 2023-06-26 PROCEDURE — 36415 COLL VENOUS BLD VENIPUNCTURE: CPT

## 2023-06-26 PROCEDURE — 84439 ASSAY OF FREE THYROXINE: CPT

## 2023-06-26 PROCEDURE — 84443 ASSAY THYROID STIM HORMONE: CPT

## 2023-06-29 ENCOUNTER — ROUTINE PRENATAL (OUTPATIENT)
Dept: OBGYN CLINIC | Facility: CLINIC | Age: 43
End: 2023-06-29

## 2023-06-29 VITALS
HEART RATE: 83 BPM | DIASTOLIC BLOOD PRESSURE: 74 MMHG | WEIGHT: 189 LBS | BODY MASS INDEX: 30 KG/M2 | SYSTOLIC BLOOD PRESSURE: 133 MMHG

## 2023-06-29 DIAGNOSIS — Z34.82 ENCOUNTER FOR SUPERVISION OF OTHER NORMAL PREGNANCY IN SECOND TRIMESTER: Primary | ICD-10-CM

## 2023-06-29 PROCEDURE — 3075F SYST BP GE 130 - 139MM HG: CPT | Performed by: ADVANCED PRACTICE MIDWIFE

## 2023-06-29 PROCEDURE — 3078F DIAST BP <80 MM HG: CPT | Performed by: ADVANCED PRACTICE MIDWIFE

## 2023-07-24 ENCOUNTER — ROUTINE PRENATAL (OUTPATIENT)
Dept: OBGYN CLINIC | Facility: CLINIC | Age: 43
End: 2023-07-24

## 2023-07-24 ENCOUNTER — PATIENT MESSAGE (OUTPATIENT)
Dept: ENDOCRINOLOGY CLINIC | Facility: CLINIC | Age: 43
End: 2023-07-24

## 2023-07-24 ENCOUNTER — LAB ENCOUNTER (OUTPATIENT)
Dept: LAB | Facility: HOSPITAL | Age: 43
End: 2023-07-24
Attending: INTERNAL MEDICINE
Payer: COMMERCIAL

## 2023-07-24 VITALS
WEIGHT: 191 LBS | HEART RATE: 69 BPM | DIASTOLIC BLOOD PRESSURE: 75 MMHG | SYSTOLIC BLOOD PRESSURE: 121 MMHG | BODY MASS INDEX: 30 KG/M2

## 2023-07-24 DIAGNOSIS — E03.8 HYPOTHYROIDISM DUE TO HASHIMOTO'S THYROIDITIS: ICD-10-CM

## 2023-07-24 DIAGNOSIS — E03.8 HYPOTHYROIDISM DUE TO HASHIMOTO'S THYROIDITIS: Primary | ICD-10-CM

## 2023-07-24 DIAGNOSIS — O09.522 ADVANCED MATERNAL AGE IN MULTIGRAVIDA, SECOND TRIMESTER: Primary | ICD-10-CM

## 2023-07-24 DIAGNOSIS — E06.3 HYPOTHYROIDISM DUE TO HASHIMOTO'S THYROIDITIS: ICD-10-CM

## 2023-07-24 DIAGNOSIS — E06.3 HYPOTHYROIDISM DUE TO HASHIMOTO'S THYROIDITIS: Primary | ICD-10-CM

## 2023-07-24 LAB
T4 FREE SERPL-MCNC: 0.9 NG/DL (ref 0.8–1.7)
TSI SER-ACNC: 1.5 MIU/ML (ref 0.36–3.74)

## 2023-07-24 PROCEDURE — 84439 ASSAY OF FREE THYROXINE: CPT

## 2023-07-24 PROCEDURE — 84443 ASSAY THYROID STIM HORMONE: CPT

## 2023-07-24 PROCEDURE — 36415 COLL VENOUS BLD VENIPUNCTURE: CPT

## 2023-07-24 PROCEDURE — 3078F DIAST BP <80 MM HG: CPT | Performed by: ADVANCED PRACTICE MIDWIFE

## 2023-07-24 PROCEDURE — 3074F SYST BP LT 130 MM HG: CPT | Performed by: ADVANCED PRACTICE MIDWIFE

## 2023-07-24 NOTE — PROGRESS NOTES
Feeling well. Endorses regular fetal movement. Denies contractions, LOF, vaginal bleeding. Has anatomy scan next week. Reviewed warning signs and when to call.  SABA 4 wks

## 2023-08-02 ENCOUNTER — HOSPITAL ENCOUNTER (OUTPATIENT)
Dept: PERINATAL CARE | Facility: HOSPITAL | Age: 43
Discharge: HOME | End: 2023-08-02
Attending: OBSTETRICS & GYNECOLOGY
Payer: COMMERCIAL

## 2023-08-02 VITALS
HEART RATE: 98 BPM | WEIGHT: 191 LBS | SYSTOLIC BLOOD PRESSURE: 141 MMHG | BODY MASS INDEX: 30 KG/M2 | DIASTOLIC BLOOD PRESSURE: 77 MMHG

## 2023-08-02 DIAGNOSIS — O09.522 MULTIGRAVIDA OF ADVANCED MATERNAL AGE IN SECOND TRIMESTER: ICD-10-CM

## 2023-08-02 DIAGNOSIS — Z36.89 ENCOUNTER FOR FETAL ANATOMIC SURVEY: ICD-10-CM

## 2023-08-02 DIAGNOSIS — O99.212 OTHER OBESITY DUE TO EXCESS CALORIES AFFECTING PREGNANCY IN SECOND TRIMESTER: ICD-10-CM

## 2023-08-02 DIAGNOSIS — E66.09 OTHER OBESITY DUE TO EXCESS CALORIES AFFECTING PREGNANCY IN SECOND TRIMESTER: ICD-10-CM

## 2023-08-02 DIAGNOSIS — O09.522 MULTIGRAVIDA OF ADVANCED MATERNAL AGE IN SECOND TRIMESTER: Primary | ICD-10-CM

## 2023-08-02 PROCEDURE — 76811 OB US DETAILED SNGL FETUS: CPT | Performed by: OBSTETRICS & GYNECOLOGY

## 2023-08-11 PROBLEM — Z82.79 FAMILY HISTORY OF CONGENITAL HEART DEFECT: Status: ACTIVE | Noted: 2023-08-11

## 2023-08-26 ENCOUNTER — LAB ENCOUNTER (OUTPATIENT)
Dept: LAB | Facility: HOSPITAL | Age: 43
End: 2023-08-26
Attending: INTERNAL MEDICINE
Payer: COMMERCIAL

## 2023-08-26 DIAGNOSIS — E03.8 HYPOTHYROIDISM DUE TO HASHIMOTO'S THYROIDITIS: ICD-10-CM

## 2023-08-26 DIAGNOSIS — E06.3 HYPOTHYROIDISM DUE TO HASHIMOTO'S THYROIDITIS: ICD-10-CM

## 2023-08-26 LAB
T4 FREE SERPL-MCNC: 0.8 NG/DL (ref 0.8–1.7)
TSI SER-ACNC: 1.53 MIU/ML (ref 0.36–3.74)

## 2023-08-26 PROCEDURE — 36415 COLL VENOUS BLD VENIPUNCTURE: CPT

## 2023-08-26 PROCEDURE — 84443 ASSAY THYROID STIM HORMONE: CPT

## 2023-08-26 PROCEDURE — 84439 ASSAY OF FREE THYROXINE: CPT

## 2023-08-29 ENCOUNTER — HOSPITAL ENCOUNTER (OUTPATIENT)
Dept: PERINATAL CARE | Facility: HOSPITAL | Age: 43
Discharge: HOME | End: 2023-08-29
Attending: OBSTETRICS & GYNECOLOGY
Payer: COMMERCIAL

## 2023-08-29 VITALS
DIASTOLIC BLOOD PRESSURE: 72 MMHG | HEART RATE: 77 BPM | WEIGHT: 195 LBS | BODY MASS INDEX: 31 KG/M2 | SYSTOLIC BLOOD PRESSURE: 124 MMHG

## 2023-08-29 DIAGNOSIS — Z82.79 FAMILY HISTORY OF CONGENITAL HEART DEFECT: ICD-10-CM

## 2023-08-29 DIAGNOSIS — O09.522 MULTIGRAVIDA OF ADVANCED MATERNAL AGE IN SECOND TRIMESTER: ICD-10-CM

## 2023-08-29 DIAGNOSIS — Z82.79 FAMILY HISTORY OF CONGENITAL HEART DEFECT: Primary | ICD-10-CM

## 2023-08-29 PROCEDURE — 93325 DOPPLER ECHO COLOR FLOW MAPG: CPT

## 2023-08-29 PROCEDURE — 76825 ECHO EXAM OF FETAL HEART: CPT | Performed by: OBSTETRICS & GYNECOLOGY

## 2023-08-29 PROCEDURE — 76827 ECHO EXAM OF FETAL HEART: CPT

## 2023-09-13 PROBLEM — R94.6 ABNORMAL THYROID FUNCTION TEST: Status: RESOLVED | Noted: 2021-12-20 | Resolved: 2023-09-13

## 2023-09-13 PROBLEM — E03.8 OTHER SPECIFIED HYPOTHYROIDISM: Status: ACTIVE | Noted: 2021-12-10

## 2023-09-19 ENCOUNTER — TELEPHONE (OUTPATIENT)
Dept: OBGYN CLINIC | Facility: CLINIC | Age: 43
End: 2023-09-19

## 2023-09-20 NOTE — TELEPHONE ENCOUNTER
Jimmy Ryan called inquiring about covid booster during pregnancy. Informed her that covid booster is recommended during pregnancy.

## 2023-09-27 ENCOUNTER — OFFICE VISIT (OUTPATIENT)
Dept: SURGERY | Facility: CLINIC | Age: 43
End: 2023-09-27
Payer: COMMERCIAL

## 2023-09-27 VITALS
DIASTOLIC BLOOD PRESSURE: 74 MMHG | HEIGHT: 67 IN | OXYGEN SATURATION: 98 % | HEART RATE: 85 BPM | TEMPERATURE: 98 F | BODY MASS INDEX: 31.71 KG/M2 | WEIGHT: 202 LBS | RESPIRATION RATE: 16 BRPM | SYSTOLIC BLOOD PRESSURE: 119 MMHG

## 2023-09-27 DIAGNOSIS — Z80.3 FAMILY HISTORY OF MALIGNANT NEOPLASM OF BREAST: Primary | ICD-10-CM

## 2023-09-27 PROCEDURE — 3008F BODY MASS INDEX DOCD: CPT | Performed by: SURGERY

## 2023-09-27 PROCEDURE — 99213 OFFICE O/P EST LOW 20 MIN: CPT | Performed by: SURGERY

## 2023-09-27 PROCEDURE — 3074F SYST BP LT 130 MM HG: CPT | Performed by: SURGERY

## 2023-09-27 PROCEDURE — 3078F DIAST BP <80 MM HG: CPT | Performed by: SURGERY

## 2023-09-28 ENCOUNTER — ROUTINE PRENATAL (OUTPATIENT)
Dept: OBGYN CLINIC | Facility: CLINIC | Age: 43
End: 2023-09-28

## 2023-09-28 VITALS
HEART RATE: 88 BPM | SYSTOLIC BLOOD PRESSURE: 125 MMHG | WEIGHT: 202.25 LBS | BODY MASS INDEX: 32 KG/M2 | DIASTOLIC BLOOD PRESSURE: 77 MMHG

## 2023-09-28 DIAGNOSIS — Z34.83 ENCOUNTER FOR SUPERVISION OF OTHER NORMAL PREGNANCY IN THIRD TRIMESTER: Primary | ICD-10-CM

## 2023-09-28 PROCEDURE — 3074F SYST BP LT 130 MM HG: CPT | Performed by: ADVANCED PRACTICE MIDWIFE

## 2023-09-28 PROCEDURE — 3078F DIAST BP <80 MM HG: CPT | Performed by: ADVANCED PRACTICE MIDWIFE

## 2023-09-28 RX ORDER — ASPIRIN 81 MG/1
TABLET, CHEWABLE ORAL DAILY
COMMUNITY

## 2023-09-28 RX ORDER — MONTELUKAST SODIUM 4 MG/1
TABLET, CHEWABLE ORAL AS DIRECTED
COMMUNITY
End: 2023-09-28

## 2023-09-28 NOTE — PROGRESS NOTES
Feeling well. Endorses regular fetal movement. Denies contractions, LOF, vaginal bleeding. She has not completed third trimester labs. Instructed her to do so ASAP and definitely within the next week. She needs Rhogam at next visit or after completing labs. Reviewed warning signs and when to call.  SABA 2 wks

## 2023-09-29 ENCOUNTER — LAB ENCOUNTER (OUTPATIENT)
Dept: LAB | Facility: HOSPITAL | Age: 43
End: 2023-09-29
Attending: ADVANCED PRACTICE MIDWIFE
Payer: COMMERCIAL

## 2023-09-29 DIAGNOSIS — E06.3 HYPOTHYROIDISM DUE TO HASHIMOTO'S THYROIDITIS: ICD-10-CM

## 2023-09-29 DIAGNOSIS — Z34.83 ENCOUNTER FOR SUPERVISION OF OTHER NORMAL PREGNANCY IN THIRD TRIMESTER: ICD-10-CM

## 2023-09-29 DIAGNOSIS — E03.8 HYPOTHYROIDISM DUE TO HASHIMOTO'S THYROIDITIS: ICD-10-CM

## 2023-09-29 LAB
ANTIBODY SCREEN: NEGATIVE
BASOPHILS # BLD AUTO: 0.03 X10(3) UL (ref 0–0.2)
BASOPHILS NFR BLD AUTO: 0.3 %
DEPRECATED RDW RBC AUTO: 43.5 FL (ref 35.1–46.3)
EOSINOPHIL # BLD AUTO: 0.11 X10(3) UL (ref 0–0.7)
EOSINOPHIL NFR BLD AUTO: 1.3 %
ERYTHROCYTE [DISTWIDTH] IN BLOOD BY AUTOMATED COUNT: 13.2 % (ref 11–15)
GLUCOSE 1H P GLC SERPL-MCNC: 117 MG/DL
HCT VFR BLD AUTO: 34.4 %
HGB BLD-MCNC: 12.2 G/DL
IMM GRANULOCYTES # BLD AUTO: 0.08 X10(3) UL (ref 0–1)
IMM GRANULOCYTES NFR BLD: 0.9 %
LYMPHOCYTES # BLD AUTO: 2.09 X10(3) UL (ref 1–4)
LYMPHOCYTES NFR BLD AUTO: 23.9 %
MCH RBC QN AUTO: 32.6 PG (ref 26–34)
MCHC RBC AUTO-ENTMCNC: 35.5 G/DL (ref 31–37)
MCV RBC AUTO: 92 FL
MONOCYTES # BLD AUTO: 0.53 X10(3) UL (ref 0.1–1)
MONOCYTES NFR BLD AUTO: 6.1 %
NEUTROPHILS # BLD AUTO: 5.9 X10 (3) UL (ref 1.5–7.7)
NEUTROPHILS # BLD AUTO: 5.9 X10(3) UL (ref 1.5–7.7)
NEUTROPHILS NFR BLD AUTO: 67.5 %
PLATELET # BLD AUTO: 210 10(3)UL (ref 150–450)
RBC # BLD AUTO: 3.74 X10(6)UL
RH BLOOD TYPE: NEGATIVE
T4 FREE SERPL-MCNC: 0.9 NG/DL (ref 0.8–1.7)
TSI SER-ACNC: 1.68 MIU/ML (ref 0.36–3.74)
WBC # BLD AUTO: 8.7 X10(3) UL (ref 4–11)

## 2023-09-29 PROCEDURE — 86850 RBC ANTIBODY SCREEN: CPT | Performed by: ADVANCED PRACTICE MIDWIFE

## 2023-09-29 PROCEDURE — 86901 BLOOD TYPING SEROLOGIC RH(D): CPT | Performed by: ADVANCED PRACTICE MIDWIFE

## 2023-09-29 PROCEDURE — 85025 COMPLETE CBC W/AUTO DIFF WBC: CPT | Performed by: ADVANCED PRACTICE MIDWIFE

## 2023-09-29 PROCEDURE — 86900 BLOOD TYPING SEROLOGIC ABO: CPT | Performed by: ADVANCED PRACTICE MIDWIFE

## 2023-09-29 PROCEDURE — 86780 TREPONEMA PALLIDUM: CPT | Performed by: ADVANCED PRACTICE MIDWIFE

## 2023-09-29 PROCEDURE — 82950 GLUCOSE TEST: CPT | Performed by: ADVANCED PRACTICE MIDWIFE

## 2023-09-29 PROCEDURE — 87389 HIV-1 AG W/HIV-1&-2 AB AG IA: CPT | Performed by: ADVANCED PRACTICE MIDWIFE

## 2023-10-02 LAB — T PALLIDUM AB SER QL: NEGATIVE

## 2023-10-12 ENCOUNTER — ROUTINE PRENATAL (OUTPATIENT)
Dept: OBGYN CLINIC | Facility: CLINIC | Age: 43
End: 2023-10-12

## 2023-10-12 VITALS
BODY MASS INDEX: 32 KG/M2 | SYSTOLIC BLOOD PRESSURE: 123 MMHG | WEIGHT: 204.63 LBS | DIASTOLIC BLOOD PRESSURE: 75 MMHG | HEART RATE: 94 BPM

## 2023-10-12 DIAGNOSIS — O09.529 SUPERVISION OF HIGH-RISK PREGNANCY OF ELDERLY MULTIGRAVIDA: Primary | ICD-10-CM

## 2023-10-12 PROCEDURE — 90715 TDAP VACCINE 7 YRS/> IM: CPT | Performed by: ADVANCED PRACTICE MIDWIFE

## 2023-10-12 PROCEDURE — 3078F DIAST BP <80 MM HG: CPT | Performed by: ADVANCED PRACTICE MIDWIFE

## 2023-10-12 PROCEDURE — 3074F SYST BP LT 130 MM HG: CPT | Performed by: ADVANCED PRACTICE MIDWIFE

## 2023-10-12 PROCEDURE — 90471 IMMUNIZATION ADMIN: CPT | Performed by: ADVANCED PRACTICE MIDWIFE

## 2023-10-12 NOTE — PROGRESS NOTES
Elijah Stover is a 37year old , at 30w3d, here for her return OB visit. Currently, she is feeling well. Denies contractions, bleeding, and leakage of fluid. Endorses active fetus. Vital signs and weight reviewed  See flowsheets       Today's Assessment/Plan:   Rhogam today for Rh negative  Tdap recommendation reviewed and pt desires  Discussed MD consult for TOLAC, referral placed and pt instructed to schedule. Next visit: Follow up OB: 2 weeks    Reviewed:   3rd trimester precautions and expectations   labor precautions  Kick counts  Danger signs  Prenatal visit schedule  Recommendations and rationale for COVID, Tdap, and flu shots in pregnancy. Pt verbalized understanding. All questions answered.  No barriers to learning identified

## 2023-10-12 NOTE — PROGRESS NOTES
Apolonia Colin was given Rhogam in the R ventrogluteal site per order. Patient tolerated well. Blood Type O negative, antibody negative. Documented in [facility], and paper copy placed in Rhogam tracking book.

## 2023-10-23 ENCOUNTER — LAB ENCOUNTER (OUTPATIENT)
Dept: LAB | Facility: HOSPITAL | Age: 43
End: 2023-10-23
Attending: INTERNAL MEDICINE

## 2023-10-23 DIAGNOSIS — E06.3 HYPOTHYROIDISM DUE TO HASHIMOTO'S THYROIDITIS: ICD-10-CM

## 2023-10-23 DIAGNOSIS — E03.8 HYPOTHYROIDISM DUE TO HASHIMOTO'S THYROIDITIS: ICD-10-CM

## 2023-10-23 LAB
T4 FREE SERPL-MCNC: 0.8 NG/DL (ref 0.8–1.7)
TSI SER-ACNC: 1.15 MIU/ML (ref 0.36–3.74)

## 2023-10-23 PROCEDURE — 84439 ASSAY OF FREE THYROXINE: CPT

## 2023-10-23 PROCEDURE — 84443 ASSAY THYROID STIM HORMONE: CPT

## 2023-10-23 PROCEDURE — 36415 COLL VENOUS BLD VENIPUNCTURE: CPT

## 2023-10-24 ENCOUNTER — HOSPITAL ENCOUNTER (OUTPATIENT)
Dept: PERINATAL CARE | Facility: HOSPITAL | Age: 43
Discharge: HOME | End: 2023-10-24
Attending: OBSTETRICS & GYNECOLOGY

## 2023-10-24 ENCOUNTER — ROUTINE PRENATAL (OUTPATIENT)
Dept: OBGYN CLINIC | Facility: CLINIC | Age: 43
End: 2023-10-24

## 2023-10-24 VITALS
SYSTOLIC BLOOD PRESSURE: 124 MMHG | HEART RATE: 88 BPM | DIASTOLIC BLOOD PRESSURE: 74 MMHG | WEIGHT: 205 LBS | BODY MASS INDEX: 32 KG/M2

## 2023-10-24 VITALS
BODY MASS INDEX: 32.71 KG/M2 | HEIGHT: 67 IN | DIASTOLIC BLOOD PRESSURE: 70 MMHG | WEIGHT: 208.38 LBS | SYSTOLIC BLOOD PRESSURE: 132 MMHG

## 2023-10-24 DIAGNOSIS — Z98.891 H/O CESAREAN SECTION: Chronic | ICD-10-CM

## 2023-10-24 DIAGNOSIS — Z98.891 H/O CESAREAN SECTION: Primary | ICD-10-CM

## 2023-10-24 DIAGNOSIS — O09.291 HISTORY OF SHOULDER DYSTOCIA IN PRIOR PREGNANCY, CURRENTLY PREGNANT IN FIRST TRIMESTER: ICD-10-CM

## 2023-10-24 DIAGNOSIS — O09.523 MULTIGRAVIDA OF ADVANCED MATERNAL AGE IN THIRD TRIMESTER: ICD-10-CM

## 2023-10-24 DIAGNOSIS — Z82.79 FAMILY HISTORY OF CONGENITAL HEART DEFECT: ICD-10-CM

## 2023-10-24 DIAGNOSIS — O09.523 MULTIGRAVIDA OF ADVANCED MATERNAL AGE IN THIRD TRIMESTER: Primary | ICD-10-CM

## 2023-10-24 PROBLEM — N97.8 FEMALE INFERTILITY DUE TO ADVANCED MATERNAL AGE: Status: RESOLVED | Noted: 2021-08-12 | Resolved: 2023-10-24

## 2023-10-24 PROCEDURE — 3008F BODY MASS INDEX DOCD: CPT | Performed by: OBSTETRICS & GYNECOLOGY

## 2023-10-24 PROCEDURE — 76819 FETAL BIOPHYS PROFIL W/O NST: CPT

## 2023-10-24 PROCEDURE — 76816 OB US FOLLOW-UP PER FETUS: CPT | Performed by: OBSTETRICS & GYNECOLOGY

## 2023-10-24 PROCEDURE — 99203 OFFICE O/P NEW LOW 30 MIN: CPT | Performed by: OBSTETRICS & GYNECOLOGY

## 2023-10-24 PROCEDURE — 3075F SYST BP GE 130 - 139MM HG: CPT | Performed by: OBSTETRICS & GYNECOLOGY

## 2023-10-24 PROCEDURE — 3078F DIAST BP <80 MM HG: CPT | Performed by: OBSTETRICS & GYNECOLOGY

## 2023-10-26 ENCOUNTER — TELEPHONE (OUTPATIENT)
Dept: OBGYN CLINIC | Facility: CLINIC | Age: 43
End: 2023-10-26

## 2023-10-26 ENCOUNTER — ROUTINE PRENATAL (OUTPATIENT)
Dept: OBGYN CLINIC | Facility: CLINIC | Age: 43
End: 2023-10-26

## 2023-10-26 VITALS
DIASTOLIC BLOOD PRESSURE: 78 MMHG | HEART RATE: 105 BPM | WEIGHT: 206 LBS | SYSTOLIC BLOOD PRESSURE: 119 MMHG | BODY MASS INDEX: 32 KG/M2

## 2023-10-26 DIAGNOSIS — Z34.83 ENCOUNTER FOR SUPERVISION OF OTHER NORMAL PREGNANCY IN THIRD TRIMESTER: Primary | ICD-10-CM

## 2023-10-26 PROCEDURE — 3078F DIAST BP <80 MM HG: CPT | Performed by: ADVANCED PRACTICE MIDWIFE

## 2023-10-26 PROCEDURE — 3074F SYST BP LT 130 MM HG: CPT | Performed by: ADVANCED PRACTICE MIDWIFE

## 2023-10-26 NOTE — TELEPHONE ENCOUNTER
Nathaniel reynoso informed pt can do both NST/SABA at [facility] with provider present. Informed to schedule NST and in notes to write SABA.

## 2023-10-26 NOTE — PROGRESS NOTES
Katherine Frazier is a 37year old , at [address] [facility], here for her return OB visit. Currently, she is feeling well. Denies contractions, bleeding, and leakage of fluid. Endorses active fetus. Has been getting big bills every time she goes to [facility]. Her insurance told her it was because they are located within the hospital. She wants to do her weekly  testing at the [facility] office. Vital signs and weight reviewed  See flowsheets       Today's Assessment/Plan:   Pt given number to schedule her NSTs with the [facility] group. She should have one within the week. Next visit: Follow up OB: 2 weeks    Reviewed:   3rd trimester precautions and expectations   labor precautions  Kick counts  Danger signs  Prenatal visit schedule    Pt verbalized understanding. All questions answered.  No barriers to learning identified

## 2023-10-26 NOTE — TELEPHONE ENCOUNTER
Patient is stating she only wants NST's scheduled with our office. She does not want to schedule with the provider (renetta.) Unsure of how to proceed please assist. Patient also stated she only wants to go to the [facility] location.

## 2023-10-26 NOTE — TELEPHONE ENCOUNTER
Patient was told that her NSTs could be scheduled with Dr Joselyn rock. What they have available is very inconvenient for her schedule and they are telling her the doctor would need to be present for all of those appointments. Patient would like to know if there is another option to get this done. Please advise.

## 2023-10-27 ENCOUNTER — TELEPHONE (OUTPATIENT)
Dept: SURGERY | Facility: CLINIC | Age: 43
End: 2023-10-27

## 2023-10-27 NOTE — TELEPHONE ENCOUNTER
Pt states, per Dr. Candelaria Arroyo she can schedule her weekly NSTs at Dr. Kenia Lara office. Pt requested this due to insurance coverage. Prefers not to have NSTs at [facility] due to higher cost.    Pt has had a difficult time scheduling these in-office weekly NSTs due to appointment availability. Pt asking if these appointments have to be scheduled with the doctor, or can they be scheduled as a Nurse visit? Please provide clarification. In communication with pt, okay to leave detailed voicemail or send MyChart message.

## 2023-10-27 NOTE — TELEPHONE ENCOUNTER
Pt calling to tell Elidia Ruiz best of luck to her and her family and thanks for all her help with fertility and urology

## 2023-10-27 NOTE — TELEPHONE ENCOUNTER
RN spoke with pt and booked several NST appts in the [facility] office. RN to book more appts and inform pt of dates and times. Pt verbalized understanding and agreed with POC.

## 2023-10-30 ENCOUNTER — TELEPHONE (OUTPATIENT)
Dept: OBGYN CLINIC | Facility: CLINIC | Age: 43
End: 2023-10-30

## 2023-10-30 NOTE — TELEPHONE ENCOUNTER
The patient missed her appointment this morning. She wanted to have an appointment this week if possible for her NST. She says it's ok to leave a message in my-chart and also ok to schedule it and let her know.

## 2023-10-31 ENCOUNTER — ROUTINE PRENATAL (OUTPATIENT)
Dept: OBGYN CLINIC | Facility: CLINIC | Age: 43
End: 2023-10-31

## 2023-10-31 VITALS
BODY MASS INDEX: 32.77 KG/M2 | DIASTOLIC BLOOD PRESSURE: 72 MMHG | SYSTOLIC BLOOD PRESSURE: 124 MMHG | HEIGHT: 67 IN | WEIGHT: 208.81 LBS

## 2023-10-31 DIAGNOSIS — O09.522 ADVANCED MATERNAL AGE IN MULTIGRAVIDA, SECOND TRIMESTER: Primary | ICD-10-CM

## 2023-10-31 PROCEDURE — 3078F DIAST BP <80 MM HG: CPT | Performed by: OBSTETRICS & GYNECOLOGY

## 2023-10-31 PROCEDURE — 3074F SYST BP LT 130 MM HG: CPT | Performed by: OBSTETRICS & GYNECOLOGY

## 2023-10-31 PROCEDURE — 59025 FETAL NON-STRESS TEST: CPT | Performed by: OBSTETRICS & GYNECOLOGY

## 2023-10-31 PROCEDURE — 3008F BODY MASS INDEX DOCD: CPT | Performed by: OBSTETRICS & GYNECOLOGY

## 2023-11-09 ENCOUNTER — ROUTINE PRENATAL (OUTPATIENT)
Dept: OBGYN CLINIC | Facility: CLINIC | Age: 43
End: 2023-11-09

## 2023-11-09 VITALS
DIASTOLIC BLOOD PRESSURE: 77 MMHG | HEART RATE: 86 BPM | WEIGHT: 208 LBS | BODY MASS INDEX: 33 KG/M2 | SYSTOLIC BLOOD PRESSURE: 138 MMHG

## 2023-11-09 DIAGNOSIS — Z34.83 ENCOUNTER FOR SUPERVISION OF OTHER NORMAL PREGNANCY IN THIRD TRIMESTER: Primary | ICD-10-CM

## 2023-11-09 PROCEDURE — 3075F SYST BP GE 130 - 139MM HG: CPT | Performed by: ADVANCED PRACTICE MIDWIFE

## 2023-11-09 PROCEDURE — 3078F DIAST BP <80 MM HG: CPT | Performed by: ADVANCED PRACTICE MIDWIFE

## 2023-11-09 NOTE — PROGRESS NOTES
Feeling well. Endorses regular fetal movement. Denies contractions, LOF, vaginal bleeding. Cephalic but head slightly to maternal right. Has NSTs scheduled through [facility]. Reviewed warning signs and when to call.  SABA 2 wks

## 2023-11-11 ENCOUNTER — ROUTINE PRENATAL (OUTPATIENT)
Dept: OBGYN CLINIC | Facility: CLINIC | Age: 43
End: 2023-11-11
Payer: COMMERCIAL

## 2023-11-11 VITALS
HEIGHT: 67 IN | BODY MASS INDEX: 32.67 KG/M2 | DIASTOLIC BLOOD PRESSURE: 72 MMHG | SYSTOLIC BLOOD PRESSURE: 140 MMHG | WEIGHT: 208.19 LBS

## 2023-11-11 DIAGNOSIS — O09.523 MULTIGRAVIDA OF ADVANCED MATERNAL AGE IN THIRD TRIMESTER: Primary | Chronic | ICD-10-CM

## 2023-11-11 PROCEDURE — 3077F SYST BP >= 140 MM HG: CPT | Performed by: OBSTETRICS & GYNECOLOGY

## 2023-11-11 PROCEDURE — 3078F DIAST BP <80 MM HG: CPT | Performed by: OBSTETRICS & GYNECOLOGY

## 2023-11-11 PROCEDURE — 59025 FETAL NON-STRESS TEST: CPT | Performed by: OBSTETRICS & GYNECOLOGY

## 2023-11-11 PROCEDURE — 3008F BODY MASS INDEX DOCD: CPT | Performed by: OBSTETRICS & GYNECOLOGY

## 2023-11-11 NOTE — PROGRESS NOTES
Doing well. No OB complaints. +FM. Here for NST.   NST reactive. SABA with midwives. Dr. Daniel Clemens MD    [facility] OBGYN     This note was created by [facility] voice recognition. Errors in content may be related to improper recognition by the system; efforts to review and correct have been done but errors may still exist. Please be advised the primary purpose of this note is for me to communicate medical care. Standard sentence structure is not always used. Medical terminology and medical abbreviations may be used. There may be grammatical, typographical, and automated fill ins that may have errors missed in proofreading.

## 2023-11-15 ENCOUNTER — ROUTINE PRENATAL (OUTPATIENT)
Dept: OBGYN CLINIC | Facility: CLINIC | Age: 43
End: 2023-11-15
Payer: COMMERCIAL

## 2023-11-15 VITALS
DIASTOLIC BLOOD PRESSURE: 76 MMHG | WEIGHT: 210 LBS | BODY MASS INDEX: 32.96 KG/M2 | HEIGHT: 67 IN | SYSTOLIC BLOOD PRESSURE: 128 MMHG

## 2023-11-15 DIAGNOSIS — O09.523 MULTIGRAVIDA OF ADVANCED MATERNAL AGE IN THIRD TRIMESTER: Primary | ICD-10-CM

## 2023-11-15 PROCEDURE — 3008F BODY MASS INDEX DOCD: CPT | Performed by: OBSTETRICS & GYNECOLOGY

## 2023-11-15 PROCEDURE — 3074F SYST BP LT 130 MM HG: CPT | Performed by: OBSTETRICS & GYNECOLOGY

## 2023-11-15 PROCEDURE — 3078F DIAST BP <80 MM HG: CPT | Performed by: OBSTETRICS & GYNECOLOGY

## 2023-11-15 PROCEDURE — 59025 FETAL NON-STRESS TEST: CPT | Performed by: OBSTETRICS & GYNECOLOGY

## 2023-11-21 ENCOUNTER — LAB ENCOUNTER (OUTPATIENT)
Dept: LAB | Facility: HOSPITAL | Age: 43
End: 2023-11-21
Attending: INTERNAL MEDICINE
Payer: COMMERCIAL

## 2023-11-21 ENCOUNTER — ROUTINE PRENATAL (OUTPATIENT)
Dept: OBGYN CLINIC | Facility: CLINIC | Age: 43
End: 2023-11-21
Payer: COMMERCIAL

## 2023-11-21 VITALS
BODY MASS INDEX: 33.12 KG/M2 | HEIGHT: 67 IN | SYSTOLIC BLOOD PRESSURE: 110 MMHG | DIASTOLIC BLOOD PRESSURE: 56 MMHG | WEIGHT: 211 LBS

## 2023-11-21 DIAGNOSIS — E06.3 HYPOTHYROIDISM DUE TO HASHIMOTO'S THYROIDITIS: ICD-10-CM

## 2023-11-21 DIAGNOSIS — E03.8 HYPOTHYROIDISM DUE TO HASHIMOTO'S THYROIDITIS: ICD-10-CM

## 2023-11-21 DIAGNOSIS — O09.523 MULTIGRAVIDA OF ADVANCED MATERNAL AGE IN THIRD TRIMESTER: Primary | ICD-10-CM

## 2023-11-21 LAB
T4 FREE SERPL-MCNC: 0.9 NG/DL (ref 0.8–1.7)
TSI SER-ACNC: 1.32 MIU/ML (ref 0.55–4.78)

## 2023-11-21 PROCEDURE — 36415 COLL VENOUS BLD VENIPUNCTURE: CPT

## 2023-11-21 PROCEDURE — 3008F BODY MASS INDEX DOCD: CPT | Performed by: OBSTETRICS & GYNECOLOGY

## 2023-11-21 PROCEDURE — 3078F DIAST BP <80 MM HG: CPT | Performed by: OBSTETRICS & GYNECOLOGY

## 2023-11-21 PROCEDURE — 84439 ASSAY OF FREE THYROXINE: CPT

## 2023-11-21 PROCEDURE — 84443 ASSAY THYROID STIM HORMONE: CPT

## 2023-11-21 PROCEDURE — 3074F SYST BP LT 130 MM HG: CPT | Performed by: OBSTETRICS & GYNECOLOGY

## 2023-11-22 NOTE — PROGRESS NOTES
Katty Thacker is a 37year old  pt at 36w4d here for SABA  She is feeling well other than a swollen gland in her right neck started yesterday. Denies sore throat, congestion, cough. Taking levothyroxine 50mcg  Taking baby ASA, encouraged to Stop  Received covid booster  Denies HA, dizziness, BV, spots or epigastric pain. ROS:  Occas BH, Denies cramping, bleeding, leaking of fluid. Fetus is active. Vitals:    23 0927 23 0955   BP: 145/79 134/78   Pulse: 80 86   Weight: 212 lb (96.2 kg)     Repeat BP: 134/78  See flowsheet  TW lbs  S/p Tdap, flu and Rhogam  TSH/FreeT4 WNL  3T labs WNL  10/24 US 70% AC 98%  Urine dip neg protein    Assessment/Plan:  IUP @ 36.4 wga  History of LTCS after ECV  History of shoulder dystocia  Hypothyroidism  Rh neg  Hx of Strep B pos  Elevated BP without evidence of PreE    Orders Placed This Encounter   Procedures    POC Urinalysis, Manual Dip without microscopy [[id number]]    OB Patients Only 32-36wks gestation:  RSV Vaccine - ABRYSVO 0.5mL    Strep B Culture      Reviewed:   labor precautions  Kick counts  Danger Signs/PreE s/s  GBS collected today per sumaya  twice weekly NST/BPPs are scheduled at [facility]  S/P  consult with Dr. Severino Grossman  RSV vaccine today in office  Membrane sweep next week  Deliver 39-40 wks  RTC 1 wks    Pt verbalized understanding. All questions answered.   No barriers to learning identified

## 2023-11-24 ENCOUNTER — ROUTINE PRENATAL (OUTPATIENT)
Dept: OBGYN CLINIC | Facility: CLINIC | Age: 43
End: 2023-11-24

## 2023-11-24 VITALS
WEIGHT: 212 LBS | SYSTOLIC BLOOD PRESSURE: 134 MMHG | DIASTOLIC BLOOD PRESSURE: 78 MMHG | HEART RATE: 86 BPM | BODY MASS INDEX: 33 KG/M2

## 2023-11-24 DIAGNOSIS — Z34.83 ENCOUNTER FOR SUPERVISION OF OTHER NORMAL PREGNANCY IN THIRD TRIMESTER: Primary | ICD-10-CM

## 2023-11-24 PROBLEM — Z34.90 PREGNANT: Status: ACTIVE | Noted: 2023-11-24

## 2023-11-24 PROBLEM — Z34.90 PREGNANT (HCC): Status: ACTIVE | Noted: 2023-11-24

## 2023-11-24 LAB
APPEARANCE: CLEAR
BILIRUBIN: NEGATIVE
GLUCOSE (URINE DIPSTICK): NEGATIVE MG/DL
KETONES (URINE DIPSTICK): NEGATIVE MG/DL
LEUKOCYTES: NEGATIVE
MULTISTIX LOT#: NORMAL NUMERIC
NITRITE, URINE: NEGATIVE
OCCULT BLOOD: NEGATIVE
PH, URINE: 6.5 (ref 4.5–8)
PROTEIN (URINE DIPSTICK): NEGATIVE MG/DL
SPECIFIC GRAVITY: 1.01 (ref 1–1.03)
URINE-COLOR: YELLOW
UROBILINOGEN,SEMI-QN: 0.2 MG/DL (ref 0–1.9)

## 2023-11-24 PROCEDURE — 3075F SYST BP GE 130 - 139MM HG: CPT | Performed by: ADVANCED PRACTICE MIDWIFE

## 2023-11-24 PROCEDURE — 90678 RSV VACC PREF BIVALENT IM: CPT | Performed by: ADVANCED PRACTICE MIDWIFE

## 2023-11-24 PROCEDURE — 81002 URINALYSIS NONAUTO W/O SCOPE: CPT | Performed by: ADVANCED PRACTICE MIDWIFE

## 2023-11-24 PROCEDURE — 3078F DIAST BP <80 MM HG: CPT | Performed by: ADVANCED PRACTICE MIDWIFE

## 2023-11-24 PROCEDURE — 90471 IMMUNIZATION ADMIN: CPT | Performed by: ADVANCED PRACTICE MIDWIFE

## 2023-11-24 RX ORDER — RESPIRATORY SYNCYTIAL VIRUS VACCINE 120MCG/0.5
0.5 KIT INTRAMUSCULAR ONCE
Qty: 1 EACH | Refills: 0 | Status: SHIPPED | OUTPATIENT
Start: 2023-11-24 | End: 2023-11-24

## 2023-11-25 LAB — GROUP B STREP BY PCR FOR PCR OVT: POSITIVE

## 2023-11-30 ENCOUNTER — TELEPHONE (OUTPATIENT)
Dept: OBGYN CLINIC | Facility: CLINIC | Age: 43
End: 2023-11-30

## 2023-11-30 ENCOUNTER — ROUTINE PRENATAL (OUTPATIENT)
Dept: OBGYN CLINIC | Facility: CLINIC | Age: 43
End: 2023-11-30
Payer: COMMERCIAL

## 2023-11-30 ENCOUNTER — ROUTINE PRENATAL (OUTPATIENT)
Dept: OBGYN CLINIC | Facility: CLINIC | Age: 43
End: 2023-11-30

## 2023-11-30 VITALS — SYSTOLIC BLOOD PRESSURE: 124 MMHG | DIASTOLIC BLOOD PRESSURE: 72 MMHG | HEIGHT: 67 IN | BODY MASS INDEX: 33 KG/M2

## 2023-11-30 VITALS
HEART RATE: 101 BPM | DIASTOLIC BLOOD PRESSURE: 77 MMHG | BODY MASS INDEX: 33 KG/M2 | WEIGHT: 211 LBS | SYSTOLIC BLOOD PRESSURE: 126 MMHG

## 2023-11-30 DIAGNOSIS — O09.523 MULTIGRAVIDA OF ADVANCED MATERNAL AGE IN THIRD TRIMESTER: Primary | ICD-10-CM

## 2023-11-30 PROCEDURE — 3078F DIAST BP <80 MM HG: CPT | Performed by: OBSTETRICS & GYNECOLOGY

## 2023-11-30 PROCEDURE — 3074F SYST BP LT 130 MM HG: CPT | Performed by: ADVANCED PRACTICE MIDWIFE

## 2023-11-30 PROCEDURE — 59025 FETAL NON-STRESS TEST: CPT | Performed by: OBSTETRICS & GYNECOLOGY

## 2023-11-30 PROCEDURE — 3078F DIAST BP <80 MM HG: CPT | Performed by: ADVANCED PRACTICE MIDWIFE

## 2023-11-30 PROCEDURE — 3074F SYST BP LT 130 MM HG: CPT | Performed by: OBSTETRICS & GYNECOLOGY

## 2023-11-30 NOTE — PROGRESS NOTES
Active baby. SVE 1/long/-3. Reviewed danger signs. GBS positive would like fluconazole after gbs treatment. rtc 1 week.

## 2023-12-03 ENCOUNTER — HOSPITAL ENCOUNTER (OUTPATIENT)
Age: 43
Discharge: HOME | End: 2023-12-03
Attending: EMERGENCY MEDICINE
Payer: COMMERCIAL

## 2023-12-03 VITALS
OXYGEN SATURATION: 100 % | TEMPERATURE: 98 F | DIASTOLIC BLOOD PRESSURE: 63 MMHG | RESPIRATION RATE: 20 BRPM | SYSTOLIC BLOOD PRESSURE: 133 MMHG | HEART RATE: 88 BPM

## 2023-12-03 DIAGNOSIS — J06.9 VIRAL URI WITH COUGH: Primary | ICD-10-CM

## 2023-12-03 LAB
POCT INFLUENZA A: NEGATIVE
POCT INFLUENZA B: NEGATIVE
SARS-COV-2 RNA RESP QL NAA+PROBE: NOT DETECTED

## 2023-12-03 PROCEDURE — 87502 INFLUENZA DNA AMP PROBE: CPT | Performed by: EMERGENCY MEDICINE

## 2023-12-03 PROCEDURE — 99212 OFFICE O/P EST SF 10 MIN: CPT

## 2023-12-03 PROCEDURE — 99213 OFFICE O/P EST LOW 20 MIN: CPT

## 2023-12-04 ENCOUNTER — ROUTINE PRENATAL (OUTPATIENT)
Dept: OBGYN CLINIC | Facility: CLINIC | Age: 43
End: 2023-12-04
Payer: COMMERCIAL

## 2023-12-04 VITALS
SYSTOLIC BLOOD PRESSURE: 128 MMHG | DIASTOLIC BLOOD PRESSURE: 76 MMHG | HEIGHT: 67 IN | WEIGHT: 211.19 LBS | BODY MASS INDEX: 33.15 KG/M2

## 2023-12-04 DIAGNOSIS — O09.523 MULTIGRAVIDA OF ADVANCED MATERNAL AGE IN THIRD TRIMESTER: Primary | ICD-10-CM

## 2023-12-07 ENCOUNTER — HOSPITAL ENCOUNTER (OUTPATIENT)
Facility: HOSPITAL | Age: 43
Discharge: HOME | End: 2023-12-07
Attending: OBSTETRICS & GYNECOLOGY | Admitting: OBSTETRICS & GYNECOLOGY

## 2023-12-07 ENCOUNTER — APPOINTMENT (OUTPATIENT)
Dept: ULTRASOUND IMAGING | Facility: HOSPITAL | Age: 43
End: 2023-12-07
Attending: ADVANCED PRACTICE MIDWIFE

## 2023-12-07 ENCOUNTER — ROUTINE PRENATAL (OUTPATIENT)
Dept: OBGYN CLINIC | Facility: CLINIC | Age: 43
End: 2023-12-07
Payer: COMMERCIAL

## 2023-12-07 ENCOUNTER — ROUTINE PRENATAL (OUTPATIENT)
Dept: OBGYN CLINIC | Facility: CLINIC | Age: 43
End: 2023-12-07

## 2023-12-07 VITALS
HEART RATE: 85 BPM | WEIGHT: 210 LBS | DIASTOLIC BLOOD PRESSURE: 81 MMHG | SYSTOLIC BLOOD PRESSURE: 125 MMHG | BODY MASS INDEX: 33 KG/M2

## 2023-12-07 VITALS
SYSTOLIC BLOOD PRESSURE: 139 MMHG | DIASTOLIC BLOOD PRESSURE: 69 MMHG | RESPIRATION RATE: 16 BRPM | HEART RATE: 82 BPM | TEMPERATURE: 98 F

## 2023-12-07 VITALS
BODY MASS INDEX: 32.83 KG/M2 | HEIGHT: 67 IN | DIASTOLIC BLOOD PRESSURE: 72 MMHG | SYSTOLIC BLOOD PRESSURE: 124 MMHG | WEIGHT: 209.19 LBS

## 2023-12-07 DIAGNOSIS — O09.523 MULTIGRAVIDA OF ADVANCED MATERNAL AGE IN THIRD TRIMESTER: Primary | ICD-10-CM

## 2023-12-07 PROCEDURE — 59025 FETAL NON-STRESS TEST: CPT

## 2023-12-07 PROCEDURE — 3074F SYST BP LT 130 MM HG: CPT | Performed by: OBSTETRICS & GYNECOLOGY

## 2023-12-07 PROCEDURE — 3074F SYST BP LT 130 MM HG: CPT | Performed by: ADVANCED PRACTICE MIDWIFE

## 2023-12-07 PROCEDURE — 76819 FETAL BIOPHYS PROFIL W/O NST: CPT | Performed by: ADVANCED PRACTICE MIDWIFE

## 2023-12-07 PROCEDURE — 99214 OFFICE O/P EST MOD 30 MIN: CPT

## 2023-12-07 PROCEDURE — 76818 FETAL BIOPHYS PROFILE W/NST: CPT | Performed by: ADVANCED PRACTICE MIDWIFE

## 2023-12-07 PROCEDURE — 3079F DIAST BP 80-89 MM HG: CPT | Performed by: ADVANCED PRACTICE MIDWIFE

## 2023-12-07 PROCEDURE — 3008F BODY MASS INDEX DOCD: CPT | Performed by: OBSTETRICS & GYNECOLOGY

## 2023-12-07 PROCEDURE — 59025 FETAL NON-STRESS TEST: CPT | Performed by: OBSTETRICS & GYNECOLOGY

## 2023-12-07 PROCEDURE — 3078F DIAST BP <80 MM HG: CPT | Performed by: OBSTETRICS & GYNECOLOGY

## 2023-12-07 NOTE — PROGRESS NOTES
Feeling well. Getting over cold. Endorses regular fetal movement. Denies contractions, LOF, vaginal bleeding. Did acupuncture last week and feels like baby may have dropped. SVE 1/20/-2. Sweep done. IOL scheduled for 12/18 at 0800. Reviewed warning signs and when to call.  SABA 1 wks

## 2023-12-07 NOTE — PROGRESS NOTES
Here for NST - non reactive today. Sent to [facility] triage for BPP.  CNM notified

## 2023-12-07 NOTE — PROGRESS NOTES
Pt is a 37year old female admitted to [facility]. Chief Complaint   Patient presents with    Non-stress Test     Sent from office for monitoring due to nonreactive nst       Pt is  38w3d intra-uterine pregnancy. History obtained, consents signed. Oriented to room, staff, and plan of care.

## 2023-12-12 ENCOUNTER — ROUTINE PRENATAL (OUTPATIENT)
Dept: OBGYN CLINIC | Facility: CLINIC | Age: 43
End: 2023-12-12
Payer: COMMERCIAL

## 2023-12-12 VITALS
WEIGHT: 210 LBS | HEIGHT: 67 IN | SYSTOLIC BLOOD PRESSURE: 124 MMHG | DIASTOLIC BLOOD PRESSURE: 74 MMHG | BODY MASS INDEX: 32.96 KG/M2

## 2023-12-12 DIAGNOSIS — O09.523 MULTIGRAVIDA OF ADVANCED MATERNAL AGE IN THIRD TRIMESTER: Primary | ICD-10-CM

## 2023-12-12 PROCEDURE — 3078F DIAST BP <80 MM HG: CPT | Performed by: OBSTETRICS & GYNECOLOGY

## 2023-12-12 PROCEDURE — 59025 FETAL NON-STRESS TEST: CPT | Performed by: OBSTETRICS & GYNECOLOGY

## 2023-12-12 PROCEDURE — 3008F BODY MASS INDEX DOCD: CPT | Performed by: OBSTETRICS & GYNECOLOGY

## 2023-12-12 PROCEDURE — 3074F SYST BP LT 130 MM HG: CPT | Performed by: OBSTETRICS & GYNECOLOGY

## 2023-12-13 ENCOUNTER — HOSPITAL ENCOUNTER (INPATIENT)
Facility: HOSPITAL | Age: 43
LOS: 3 days | Discharge: HOME | End: 2023-12-16
Attending: ADVANCED PRACTICE MIDWIFE | Admitting: OBSTETRICS & GYNECOLOGY
Payer: COMMERCIAL

## 2023-12-13 PROBLEM — O13.9 GESTATIONAL HYPERTENSION: Status: ACTIVE | Noted: 2023-12-13

## 2023-12-13 PROBLEM — O13.9 GESTATIONAL HYPERTENSION (HCC): Status: ACTIVE | Noted: 2023-12-13

## 2023-12-13 PROBLEM — O99.820 GROUP B STREPTOCOCCAL CARRIAGE COMPLICATING PREGNANCY (HCC): Status: ACTIVE | Noted: 2023-12-13

## 2023-12-13 PROBLEM — O99.820 GROUP B STREPTOCOCCAL CARRIAGE COMPLICATING PREGNANCY: Status: ACTIVE | Noted: 2023-12-13

## 2023-12-13 LAB
ALBUMIN SERPL-MCNC: 3.7 G/DL (ref 3.2–4.8)
ALBUMIN/GLOB SERPL: 1.5 {RATIO} (ref 1–2)
ALP LIVER SERPL-CCNC: 161 U/L
ALT SERPL-CCNC: 13 U/L
ANION GAP SERPL CALC-SCNC: 10 MMOL/L (ref 0–18)
ANTIBODY SCREEN: POSITIVE
AST SERPL-CCNC: 17 U/L (ref ?–34)
BASOPHILS # BLD AUTO: 0.03 X10(3) UL (ref 0–0.2)
BASOPHILS # BLD AUTO: 0.04 X10(3) UL (ref 0–0.2)
BASOPHILS NFR BLD AUTO: 0.3 %
BASOPHILS NFR BLD AUTO: 0.3 %
BILIRUB SERPL-MCNC: 0.5 MG/DL (ref 0.3–1.2)
BUN BLD-MCNC: <5 MG/DL (ref 9–23)
CALCIUM BLD-MCNC: 9.2 MG/DL (ref 8.7–10.4)
CHLORIDE SERPL-SCNC: 105 MMOL/L (ref 98–112)
CO2 SERPL-SCNC: 22 MMOL/L (ref 21–32)
CREAT BLD-MCNC: 0.57 MG/DL
CREAT UR-SCNC: 19.6 MG/DL
DEPRECATED RDW RBC AUTO: 43.9 FL (ref 35.1–46.3)
DEPRECATED RDW RBC AUTO: 44 FL (ref 35.1–46.3)
DIRECT COOMBS POLY: NEGATIVE
EGFRCR SERPLBLD CKD-EPI 2021: 116 ML/MIN/1.73M2 (ref 60–?)
EOSINOPHIL # BLD AUTO: 0.06 X10(3) UL (ref 0–0.7)
EOSINOPHIL # BLD AUTO: 0.12 X10(3) UL (ref 0–0.7)
EOSINOPHIL NFR BLD AUTO: 0.6 %
EOSINOPHIL NFR BLD AUTO: 0.9 %
ERYTHROCYTE [DISTWIDTH] IN BLOOD BY AUTOMATED COUNT: 13.5 % (ref 11–15)
ERYTHROCYTE [DISTWIDTH] IN BLOOD BY AUTOMATED COUNT: 13.6 % (ref 11–15)
GLOBULIN PLAS-MCNC: 2.5 G/DL (ref 2.8–4.4)
GLUCOSE BLD-MCNC: 89 MG/DL (ref 70–99)
HCT VFR BLD AUTO: 31.8 %
HCT VFR BLD AUTO: 34.9 %
HGB BLD-MCNC: 11.2 G/DL
HGB BLD-MCNC: 12 G/DL
IMM GRANULOCYTES # BLD AUTO: 0.1 X10(3) UL (ref 0–1)
IMM GRANULOCYTES # BLD AUTO: 0.11 X10(3) UL (ref 0–1)
IMM GRANULOCYTES NFR BLD: 0.8 %
IMM GRANULOCYTES NFR BLD: 1 %
LYMPHOCYTES # BLD AUTO: 2.46 X10(3) UL (ref 1–4)
LYMPHOCYTES # BLD AUTO: 2.96 X10(3) UL (ref 1–4)
LYMPHOCYTES NFR BLD AUTO: 22 %
LYMPHOCYTES NFR BLD AUTO: 25.5 %
MCH RBC QN AUTO: 30.8 PG (ref 26–34)
MCH RBC QN AUTO: 31.8 PG (ref 26–34)
MCHC RBC AUTO-ENTMCNC: 34.4 G/DL (ref 31–37)
MCHC RBC AUTO-ENTMCNC: 35.2 G/DL (ref 31–37)
MCV RBC AUTO: 89.5 FL
MCV RBC AUTO: 90.3 FL
MONOCYTES # BLD AUTO: 0.51 X10(3) UL (ref 0.1–1)
MONOCYTES # BLD AUTO: 0.91 X10(3) UL (ref 0.1–1)
MONOCYTES NFR BLD AUTO: 5.3 %
MONOCYTES NFR BLD AUTO: 6.8 %
NEUTROPHILS # BLD AUTO: 6.5 X10 (3) UL (ref 1.5–7.7)
NEUTROPHILS # BLD AUTO: 6.5 X10(3) UL (ref 1.5–7.7)
NEUTROPHILS # BLD AUTO: 9.34 X10 (3) UL (ref 1.5–7.7)
NEUTROPHILS # BLD AUTO: 9.34 X10(3) UL (ref 1.5–7.7)
NEUTROPHILS NFR BLD AUTO: 67.3 %
NEUTROPHILS NFR BLD AUTO: 69.2 %
PLATELET # BLD AUTO: 190 10(3)UL (ref 150–450)
PLATELET # BLD AUTO: 191 10(3)UL (ref 150–450)
POTASSIUM SERPL-SCNC: 3.7 MMOL/L (ref 3.5–5.1)
PROT SERPL-MCNC: 6.2 G/DL (ref 5.7–8.2)
PROT UR-MCNC: <6 MG/DL (ref ?–14)
RBC # BLD AUTO: 3.52 X10(6)UL
RBC # BLD AUTO: 3.9 X10(6)UL
RH BLOOD TYPE: NEGATIVE
SODIUM SERPL-SCNC: 137 MMOL/L (ref 136–145)
WBC # BLD AUTO: 13.5 X10(3) UL (ref 4–11)
WBC # BLD AUTO: 9.7 X10(3) UL (ref 4–11)

## 2023-12-13 PROCEDURE — 10907ZC DRAINAGE OF AMNIOTIC FLUID, THERAPEUTIC FROM PRODUCTS OF CONCEPTION, VIA NATURAL OR ARTIFICIAL OPENING: ICD-10-PCS | Performed by: ADVANCED PRACTICE MIDWIFE

## 2023-12-13 PROCEDURE — 80053 COMPREHEN METABOLIC PANEL: CPT | Performed by: ADVANCED PRACTICE MIDWIFE

## 2023-12-13 PROCEDURE — 86870 RBC ANTIBODY IDENTIFICATION: CPT | Performed by: ADVANCED PRACTICE MIDWIFE

## 2023-12-13 PROCEDURE — 3E033VJ INTRODUCTION OF OTHER HORMONE INTO PERIPHERAL VEIN, PERCUTANEOUS APPROACH: ICD-10-PCS | Performed by: ADVANCED PRACTICE MIDWIFE

## 2023-12-13 PROCEDURE — 84156 ASSAY OF PROTEIN URINE: CPT | Performed by: ADVANCED PRACTICE MIDWIFE

## 2023-12-13 PROCEDURE — 59200 INSERT CERVICAL DILATOR: CPT

## 2023-12-13 PROCEDURE — 86901 BLOOD TYPING SEROLOGIC RH(D): CPT | Performed by: ADVANCED PRACTICE MIDWIFE

## 2023-12-13 PROCEDURE — 86880 COOMBS TEST DIRECT: CPT | Performed by: ADVANCED PRACTICE MIDWIFE

## 2023-12-13 PROCEDURE — 86900 BLOOD TYPING SEROLOGIC ABO: CPT | Performed by: ADVANCED PRACTICE MIDWIFE

## 2023-12-13 PROCEDURE — 86077 PHYS BLOOD BANK SERV XMATCH: CPT | Performed by: ADVANCED PRACTICE MIDWIFE

## 2023-12-13 PROCEDURE — 82570 ASSAY OF URINE CREATININE: CPT | Performed by: ADVANCED PRACTICE MIDWIFE

## 2023-12-13 PROCEDURE — 85025 COMPLETE CBC W/AUTO DIFF WBC: CPT | Performed by: ADVANCED PRACTICE MIDWIFE

## 2023-12-13 PROCEDURE — 86850 RBC ANTIBODY SCREEN: CPT | Performed by: ADVANCED PRACTICE MIDWIFE

## 2023-12-13 PROCEDURE — 99214 OFFICE O/P EST MOD 30 MIN: CPT

## 2023-12-13 RX ORDER — ACETAMINOPHEN 500 MG
1000 TABLET ORAL EVERY 6 HOURS PRN
Status: DISCONTINUED | OUTPATIENT
Start: 2023-12-13 | End: 2023-12-14 | Stop reason: HOSPADM

## 2023-12-13 RX ORDER — ONDANSETRON 2 MG/ML
4 INJECTION INTRAMUSCULAR; INTRAVENOUS EVERY 6 HOURS PRN
Status: DISCONTINUED | OUTPATIENT
Start: 2023-12-13 | End: 2023-12-14 | Stop reason: HOSPADM

## 2023-12-13 RX ORDER — CITRIC ACID/SODIUM CITRATE 334-500MG
30 SOLUTION, ORAL ORAL AS NEEDED
Status: DISCONTINUED | OUTPATIENT
Start: 2023-12-13 | End: 2023-12-14 | Stop reason: HOSPADM

## 2023-12-13 RX ORDER — ACETAMINOPHEN 500 MG
500 TABLET ORAL EVERY 6 HOURS PRN
Status: DISCONTINUED | OUTPATIENT
Start: 2023-12-13 | End: 2023-12-14 | Stop reason: HOSPADM

## 2023-12-13 RX ORDER — NALBUPHINE HYDROCHLORIDE 10 MG/ML
10 INJECTION, SOLUTION INTRAMUSCULAR; INTRAVENOUS; SUBCUTANEOUS ONCE
Status: COMPLETED | OUTPATIENT
Start: 2023-12-14 | End: 2023-12-14

## 2023-12-13 RX ORDER — LIDOCAINE HYDROCHLORIDE 10 MG/ML
30 INJECTION, SOLUTION EPIDURAL; INFILTRATION; INTRACAUDAL; PERINEURAL ONCE
Status: DISCONTINUED | OUTPATIENT
Start: 2023-12-13 | End: 2023-12-14 | Stop reason: HOSPADM

## 2023-12-13 RX ORDER — TERBUTALINE SULFATE 1 MG/ML
0.25 INJECTION, SOLUTION SUBCUTANEOUS AS NEEDED
Status: DISCONTINUED | OUTPATIENT
Start: 2023-12-13 | End: 2023-12-14 | Stop reason: HOSPADM

## 2023-12-13 RX ORDER — DEXTROSE, SODIUM CHLORIDE, SODIUM LACTATE, POTASSIUM CHLORIDE, AND CALCIUM CHLORIDE 5; .6; .31; .03; .02 G/100ML; G/100ML; G/100ML; G/100ML; G/100ML
INJECTION, SOLUTION INTRAVENOUS CONTINUOUS
Status: DISCONTINUED | OUTPATIENT
Start: 2023-12-13 | End: 2023-12-14 | Stop reason: HOSPADM

## 2023-12-13 RX ORDER — HYDROXYZINE HYDROCHLORIDE 50 MG/ML
50 INJECTION, SOLUTION INTRAMUSCULAR ONCE AS NEEDED
Status: COMPLETED | OUTPATIENT
Start: 2023-12-13 | End: 2023-12-14

## 2023-12-13 RX ORDER — SODIUM CHLORIDE, SODIUM LACTATE, POTASSIUM CHLORIDE, CALCIUM CHLORIDE 600; 310; 30; 20 MG/100ML; MG/100ML; MG/100ML; MG/100ML
INJECTION, SOLUTION INTRAVENOUS AS NEEDED
Status: DISCONTINUED | OUTPATIENT
Start: 2023-12-13 | End: 2023-12-14 | Stop reason: HOSPADM

## 2023-12-13 RX ORDER — IBUPROFEN 600 MG/1
600 TABLET ORAL ONCE AS NEEDED
Status: DISCONTINUED | OUTPATIENT
Start: 2023-12-13 | End: 2023-12-14

## 2023-12-13 NOTE — PROGRESS NOTES
Pt is a 37year old female admitted to TR1/TR1-A. Chief Complaint   Patient presents with    R/o Labor      Pt is [id number] 39w2d intra-uterine pregnancy. History obtained, consents signed. Oriented to room, staff, and plan of care.

## 2023-12-13 NOTE — H&P
102 [facility] Patient Status:  Inpatient    1980 MRN [id number]   Location [address] [place] [address] G Attending [facility], [address] Day # 0 Admitting Humza Quintana MD     Date of Admission:  2023      HPI:   Ellis Rodriguez is a 37year old  current EGA of 39w2d with an estimated date of delivery of: 2023, by Last Menstrual Period consistent with 6.5wk ultrasound    Leti Lundy is being admitted for induction of labor. Her current obstetrical history is significant for GBS colonizer, Rh Negative, prior , history of shoulder dystocia, hypothyroidism, advanced maternal age . Patient reports good fetal movement, no bleeding, no leaking, and occasional contractions . Fetal Movement: normal.     History   Obstetric History:   OB History    Para Term  AB Living   5 3 3 0 1 3   SAB IAB Ectopic Multiple Live Births   1 0 0   3      # Outcome Date GA Lbr Miguel/2nd Weight Sex Delivery Anes PTL Lv   5 Current            4 Term 16 41w1d 04:32 / 01:18 9 lb 6.1 oz (4.255 kg) M  EPI N MADY      Birth Comments: G  4      P 3    OB Doctor: MIDWIFE    BLOOD TYPE:      Mom: 0-   Baby: O-   Sim: -         Mat HepBsAg  -        Vac  16 [facility] HIV -    Mat GBBS  +  ABx   >4hrs    Bili 10.2  LI       OAE: Passed Bilaterally    PROBLEMS:       Large for Gestational Age                              Positive Antibody                              Shoulder Dystocia                              Sibs Jaundice                                     Complications: Shoulder Dystocia   3 Term 12 38w0d  8 lb 1 oz (3.657 kg) M Caesarean EPI N MADY      Birth Comments: Sucessful version but baby's heart rate dropped. Placenta had an \"extra heart shaped pocket\". Baby born tongue tied. Baby was re- hospitalized due to jaundice .       Complications: Fetal intolerance to labor   2 SAB 11 2w0d   U          Birth Comments: No D&C   1 Term 09 40w4d 08:00 8 lb 11 oz (3.941 kg) F Vag-Spont EPI N MADY      Birth Comments: Jaundice      Complications: Meconium, Group B beta strep +     Past Medical History:   Past Medical History:   Diagnosis Date    Abnormal thyroid function test     Depression 2012    Post partum depression  no medication or counseling    Female infertility 2021    History of chicken pox age 1    History of shingles 2012    Oral herpes age 21    Tendinitis     Thyroid disease      Past Social History:   Past Surgical History:   Procedure Laterality Date          HAND/FINGER SURGERY UNLISTED Left      Family History:   Family History   Problem Relation Age of Onset    Breast Cancer Maternal Aunt         60'S    Breast Cancer Mother 44    Hypertension Father     Cancer Maternal Grandfather         Esophageal    Cancer Paternal Grandfather         Prostate and Esophageal    Breast Cancer Maternal Cousin Female         60'S     Social History:   Social History     Tobacco Use    Smoking status: Never    Smokeless tobacco: Never   Substance Use Topics    Alcohol use: Not Currently     Alcohol/week: 3.0 standard drinks of alcohol     Types: 3 Glasses of wine per week        Allergies/Medications: Allergies:   No Known Allergies  Medications:  Facility-Administered Medications Prior to Admission   Medication Dose Route Frequency Provider Last Rate Last Admin    [COMPLETED] Rho D immune globulin (RhoGAM) IM injection 300 mcg  300 mcg Intramuscular Once Buscemi, Maddalena, CNM   300 mcg at 10/12/23 1521     Medications Prior to Admission   Medication Sig Dispense Refill Last Dose    aspirin 81 MG Oral Chew Tab Chew by mouth daily.    Past Week    levothyroxine 50 MCG Oral Tab Take 1 tablet (50 mcg total) by mouth before breakfast. 90 tablet 3 2023    montelukast 10 MG Oral Tab    2023    Prenatal Vit-Fe Fumarate-FA ( PRENATAL VITAMINS) 28-0.8 MG Oral Tab Take by mouth.   12/13/2023    Pseudoephedrine HCl (SUDAFED OR) Take by mouth.  (Patient not taking: Reported on 12/12/2023)   Unknown       Review of Systems:   Constitutional: denies fever, aches, chills  HEENT: denies visual changes  Skin: denies any unusual skin lesions or itching  Lungs: denies shortness of breath  Cardiovascular: denies chest pain  GI: denies abdominal pain,denies heartburn, denies constipation or diarrhea  : denies dysuria, pelvic pain, vaginal discharge or bleeding  Musculoskeletal: denies back or joint pain  Neuro denies headaches or dizziness  Psych: denies depression or anxiety    Physical Exam:   Pulse:  [69-83] 79  BP: (124-142)/(63-74) 136/70    Constitutional: alert, appears stated age, and cooperative comfortable   Skin: no lesions or erythema  Respiratory: clear, unlabored  Cardiac: RRR  Abdomen: soft, non-tender, EFW 8.5#, presentation cephalic, uterine contractions q 4-8 minutes, mild  Fetal Surveillance:  140 BPM; Fetal heart variability: moderate  Fetal Heart Rate decelerations: none  Fetal Heart Rate accelerations: yes  Baseline FHR: 140 per minute  Uterine contractions: irregular, every 4-8 minutes, mild  Pelvis: Gynecoid  External Genitalia:  No lesions  Sterile vaginal exam: 1.5cm/30/-2 at 1015  1.5cm/30/-2 per triage RN on arrival at 0730, unchanged from office  Extremities: extremities normal, atraumatic, no cyanosis or edema  Musculoskeletal: steady gait  Reflexes: +1/+1  Psych:  Appropriate affect and speech    Results:     Prenatal Results       Initial       Test Value Reference Range Date Time    Blood Type (ABO Group)  O   09/29/23 1023    Rh Factor  Negative   09/29/23 1023    Antibody Screen (Required questions in OE to answer)  Negative   05/27/23 0747    HCT  37.5 % 35.0 - 48.0 05/27/23 0747    HGB  13.0 g/dL 12.0 - 16.0 05/27/23 0747    MCV  90.6 fL 80.0 - 100.0 05/27/23 0747    Platelets  443.0 54(7).0 - 450.0 05/27/23 0747    Rubella  Positive Positive 05/27/23 0747    TREP  Negative  Negative 05/27/23 0747    RPR ([facility])        Urine Culture  No Growth at 18-24 hrs.   05/27/23 0747    Hepatitis B  Nonreactive  Nonreactive  05/27/23 0747    HIV Combo  Non-Reactive  Non-Reactive 05/27/23 0747    HCV  Nonreactive  Nonreactive  05/27/23 0747              Optional Initial Labs       Test Value Reference Range Date Time    TSH  1.530 mIU/mL 0.358 - 3.740 08/26/23 0955       1.500 mIU/mL 0.358 - 3.740 07/24/23 1641       1.810 mIU/mL 0.358 - 3.740 06/26/23 0650       1.280 mIU/mL 0.358 - 3.740 05/27/23 0747    Pap Smear  Negative for intraepithelial lesion or malignancy  Negative for intraepithelial lesion or malignancy 11/14/19 1504    HPV  Negative  Negative 11/14/19 1504    GC DNA        Chlamydia DNA        GTT 1 Hr  108 mg/dL See comment 06/17/23 0914    Glucose Fasting        Glucose 1 Hr        Glucose 2 Hr        Glucose 3 Hr        HgB A1c  4.7 % <5.7 05/27/23 0747    Vitamin D                  8-20 Weeks       Test Value Reference Range Date Time    1st Trimester Aneuploidy Risk Assessment ^ low risk   06/05/23     Quad - Down Screen Risk Estimate - prior to 02/20/18        Quad - Down Maternal Age Risk - prior to 02/20/18        Quad - Trisomy 18 screen Risk Estimate - prior to 02/20/18        AFP Spina Bifida (Required questions in OE to answer )        QUAD/AFP - Interpretation        Free Fetal DNA         Genetic testing        Genetic testing        Genetic testing        GC DNA  Negative  Negative 06/08/23 1609    Chlamydia DNA  Negative  Negative 06/08/23 1609              24-28 Weeks       Test Value Reference Range Date Time    HCT  34.4 % 35.0 - 48.0 09/29/23 1023    HGB  12.2 g/dL 12.0 - 16.0 09/29/23 1023    Platelets  926.0 08(7).0 - 450.0 09/29/23 1023    GTT 1 Hr  117 mg/dL See comment 09/29/23 1023    Glucose Fasting        Glucose 1 Hr        Glucose 2 Hr        Glucose 3 Hr        TSH  1.680 mIU/mL 0.358 - 3.740 09/29/23 1023 1.530 mIU/mL 0.358 - 3.740 23 0955     Profile  Negative   23 1023    Urine Culture        GC DNA        Chlamydia DNA                  35-37 Weeks       Test Value Reference Range Date Time    HCT        HGB        Platelets        TREP  Negative  Negative 23 1023    RPR ([facility])        Genital Group B Culture  Positive  Negative 23 1007    TSH  1.316 mIU/mL 0.550 - 4.780 23 1557    Urine Culture        HIV Combo  Non-Reactive  Non-Reactive 23 1023    GC DNA        Chlamydia DNA                  Optional Labs       Test Value Reference Range Date Time    HgB A1c  4.7 % <5.7 23 0747    HGB Electrophoresis        Varicella Zoster  354.80  >165.00 23 0747    Cystic Fibrosis-Old         Cystic Fibrosis[32] (Required questions in OE to answer)        Cystic Fibrosis[165] (Required questions in OE to answer)        Cystic Fibrosis[165] (Required questions in OE to answer)        Cystic Fibrosis[165] (Required questions in OE to answer)        Sickle Cell        24Hr Urine Protein        24Hr Urine Creatinine        Parvo B19 IgM        Parvo B19 IgG                  Legend    ^: Historical                            Reviewed all prenatal ultrasounds    Admit labs pending    Assessment/Plan:    Aroldo Roper is at an estimated gestational age of 44w2d with an estimated date of delivery of:  2023, by Last Menstrual Period    Not in labor. Category 1 FHR tracing  Obstetrical history significant for GBS colonizer, Rh Negative, prior , history of shoulder dystocia, hypothyroidism, advanced maternal age.     Admission problem(s):    Advanced maternal age in multigravida, third trimester   NIPT low risk  Normal  testing  Delivery recommended 39-40wks      H/O  section    due to fetal intolerance to ECV   Has had successful       Rh negative, antepartum   Received routine rhogam 10/12/23      History of shoulder dystocia in prior pregnancy, currently pregnant   Shoulder dystocia with third pregnancy/ at 41.1wks and 9#6oz   Had 8#11oz baby with no shoulder         Other specified hypothyroidism   Stable on levothyroxine      Family history of congenital heart defect   Heart defect in second child that closed without intervention   Normal fetal echo      Group B streptococcal carriage complicating pregnancy   Initiate GBS prophylaxis    Killian Kam is a 37yo [id number] at 39.2wks being admitted for IOL due to [facility]. Pregnancy history significant for Rh negative, AMA, hypothyroidism, history of , history of shoulder dystocia. GBS positive   Admit to L&D   Discussed risks, benefits, alternatives to just pitocin versus cooks catheter. Patient amenable to cooks catheter placement. Cooks placed with 80cc sterile water in uterine balloon and 60cc in vaginal. Patient tolerated well. Will see what contraction pattern is like with cooks in place and then consider pitocin protocol 1 if not having regular contractions. Continuous monitoring   Anticipate vaginal delivery   Dr. Sunil Fierro notified of patient admit      Risks, benefits, alternatives and possible complications have been discussed in detail with the patient. Pre-admission, admission, and post admission procedures and expectations were discussed in detail. All questions answered, all appropriate consents will be signed at the Hospital. Admission is planned for today. IOL with cooks for cervical ripening .     Kathy Martinez CNM  2023  9:27 AM

## 2023-12-13 NOTE — PROGRESS NOTES
[facility]    Labor Progress Note    Kavin Connelly Patient Status:  Inpatient    1980 MRN [id number]   Location [address] Attending Harsh Negron, [address] Day # 0 PCP Garrett Shields MD     Subjective: Interval History:   Yara Benitez is starting to feel contractions more regularly now. FOB present and supportive.      Objective:   Vital signs in last 24 hours:  Pulse:  [69-84] 73  BP: (127-152)/(57-73) 133/67    Input/Output:    Intake/Output Summary (Last 24 hours) at 2023 1545  Last data filed at 2023 1010  Gross per 24 hour   Intake --   Output 140 ml   Net -140 ml       Fetal Surveillance:  Fetal heart tones:  145 baseline/moderate variability/accels present/no decels  Uterine contractions:  2-4 minutes apart    Sterile vaginal exam:  Dilation: 6 cm dilation   Effacement: 50%   Station:  -4    Position: Cephalic  Presentation: vertex  Cooks easily removed with gentle traction    Results:   Lab Results   Component Value Date    TREPONEMALAB Negative 2023    HBVSAG Non-Reactive 2016    ABO O 2023    RH Negative 2023    WBC 9.7 2023    HGB 12.0 2023    HCT 34.9 (L) 2023    .0 2023    CREATSERUM 0.80 2019    BUN 10 2019     2019    K 3.8 2019     2019    CO2 28.0 2019    GLU 92 2019    CA 9.0 2019    ALB 3.9 2019    ALKPHO 65 2019    BILT 0.7 2019    TP 7.4 2019    AST 12 (L) 2019    ALT 22 2019    T4F 0.9 2023    TSH 1.316 2023       Lab Results   Component Value Date    DDIMER <0.27 2017    TROP 0.00 2017    COLORUR Yellow 12/15/2021    CLARITY Hazy (A) 12/15/2021    SPECGRAVITY 1.015 2023    PROUR Negative 12/15/2021    GLUUR Negative 12/15/2021    KETUR Negative 12/15/2021    BILUR Negative 12/15/2021    BLOODURINE Negative 12/15/2021    NITRITE Negative 2023 UROBILINOGEN <2.0 12/15/2021    ESTRADA Wright (A) 12/15/2021       Assessment & Plan:      Advanced maternal age in multigravida, third trimester               NIPT low risk  Normal  testing  Delivery recommended 39-40wks       H/O  section               2012 due to fetal intolerance to ECV               Has had successful        Rh negative, antepartum               Received routine rhogam 10/12/23       History of shoulder dystocia in prior pregnancy, currently pregnant               Shoulder dystocia with third pregnancy/ at 41.1wks and 9#6oz               Had 8#11oz baby with no shoulder                      Other specified hypothyroidism               Stable on levothyroxine       Family history of congenital heart defect               Heart defect in second child that closed without intervention               Normal fetal echo       Group B streptococcal carriage complicating pregnancy               Initiate GBS prophylaxis     Sol Bailey is a 35yo [id number] at 39.2wks admitted for IOL due to [facility]. Pregnancy history significant for Rh negative, AMA, hypothyroidism, history of , history of shoulder dystocia. GBS positive               Cat 1 FHTs               s/p cooks   Pitocin at 6mu. Titrate to adequate contractions               Continuous monitoring               Anticipate vaginal delivery               Dr. Pilo Hamm notified of patient admit         Plan discussed with patient who verbalizes understanding and agreement.     Lucila Cade CNM  2023

## 2023-12-14 ENCOUNTER — ANESTHESIA (OUTPATIENT)
Dept: OBGYN UNIT | Facility: HOSPITAL | Age: 43
End: 2023-12-14
Payer: COMMERCIAL

## 2023-12-14 ENCOUNTER — ANESTHESIA EVENT (OUTPATIENT)
Dept: OBGYN UNIT | Facility: HOSPITAL | Age: 43
End: 2023-12-14
Payer: COMMERCIAL

## 2023-12-14 LAB — FETAL SCREEN RESULT: NEGATIVE

## 2023-12-14 PROCEDURE — 3E0334Z INTRODUCTION OF SERUM, TOXOID AND VACCINE INTO PERIPHERAL VEIN, PERCUTANEOUS APPROACH: ICD-10-PCS | Performed by: ADVANCED PRACTICE MIDWIFE

## 2023-12-14 PROCEDURE — 0KQM0ZZ REPAIR PERINEUM MUSCLE, OPEN APPROACH: ICD-10-PCS | Performed by: ADVANCED PRACTICE MIDWIFE

## 2023-12-14 PROCEDURE — 85461 HEMOGLOBIN FETAL: CPT | Performed by: ADVANCED PRACTICE MIDWIFE

## 2023-12-14 RX ORDER — LIDOCAINE HYDROCHLORIDE AND EPINEPHRINE 15; 5 MG/ML; UG/ML
INJECTION, SOLUTION EPIDURAL
Status: COMPLETED | OUTPATIENT
Start: 2023-12-14 | End: 2023-12-14

## 2023-12-14 RX ORDER — SIMETHICONE 80 MG
80 TABLET,CHEWABLE ORAL 3 TIMES DAILY PRN
Status: DISCONTINUED | OUTPATIENT
Start: 2023-12-14 | End: 2023-12-16

## 2023-12-14 RX ORDER — NALBUPHINE HYDROCHLORIDE 10 MG/ML
2.5 INJECTION, SOLUTION INTRAMUSCULAR; INTRAVENOUS; SUBCUTANEOUS
Status: DISCONTINUED | OUTPATIENT
Start: 2023-12-14 | End: 2023-12-16

## 2023-12-14 RX ORDER — BUPIVACAINE HCL/0.9 % NACL/PF 0.25 %
5 PLASTIC BAG, INJECTION (ML) EPIDURAL AS NEEDED
Status: DISCONTINUED | OUTPATIENT
Start: 2023-12-14 | End: 2023-12-16

## 2023-12-14 RX ORDER — ONDANSETRON 2 MG/ML
4 INJECTION INTRAMUSCULAR; INTRAVENOUS EVERY 6 HOURS PRN
Status: DISCONTINUED | OUTPATIENT
Start: 2023-12-14 | End: 2023-12-16

## 2023-12-14 RX ORDER — CARBOPROST TROMETHAMINE 250 UG/ML
INJECTION, SOLUTION INTRAMUSCULAR
Status: DISCONTINUED
Start: 2023-12-14 | End: 2023-12-14 | Stop reason: WASHOUT

## 2023-12-14 RX ORDER — ENOXAPARIN SODIUM 100 MG/ML
40 INJECTION SUBCUTANEOUS DAILY
Status: DISCONTINUED | OUTPATIENT
Start: 2023-12-14 | End: 2023-12-16

## 2023-12-14 RX ORDER — METHYLERGONOVINE MALEATE 0.2 MG/ML
INJECTION INTRAVENOUS
Status: DISCONTINUED
Start: 2023-12-14 | End: 2023-12-14 | Stop reason: WASHOUT

## 2023-12-14 RX ORDER — DOCUSATE SODIUM 100 MG/1
100 CAPSULE, LIQUID FILLED ORAL
Status: DISCONTINUED | OUTPATIENT
Start: 2023-12-14 | End: 2023-12-16

## 2023-12-14 RX ORDER — LIDOCAINE HYDROCHLORIDE 10 MG/ML
INJECTION, SOLUTION INFILTRATION; PERINEURAL
Status: COMPLETED | OUTPATIENT
Start: 2023-12-14 | End: 2023-12-14

## 2023-12-14 RX ORDER — BUPIVACAINE HYDROCHLORIDE 2.5 MG/ML
INJECTION, SOLUTION EPIDURAL; INFILTRATION; INTRACAUDAL
Status: COMPLETED | OUTPATIENT
Start: 2023-12-14 | End: 2023-12-14

## 2023-12-14 RX ORDER — LEVOTHYROXINE SODIUM 0.05 MG/1
50 TABLET ORAL
Status: DISCONTINUED | OUTPATIENT
Start: 2023-12-15 | End: 2023-12-16

## 2023-12-14 RX ORDER — ACETAMINOPHEN 500 MG
500 TABLET ORAL EVERY 6 HOURS PRN
Status: DISCONTINUED | OUTPATIENT
Start: 2023-12-14 | End: 2023-12-16

## 2023-12-14 RX ORDER — BUPIVACAINE HYDROCHLORIDE 2.5 MG/ML
20 INJECTION, SOLUTION EPIDURAL; INFILTRATION; INTRACAUDAL ONCE
Status: DISCONTINUED | OUTPATIENT
Start: 2023-12-14 | End: 2023-12-14 | Stop reason: HOSPADM

## 2023-12-14 RX ORDER — AMMONIA INHALANTS 0.04 G/.3ML
0.3 INHALANT RESPIRATORY (INHALATION) AS NEEDED
Status: DISCONTINUED | OUTPATIENT
Start: 2023-12-14 | End: 2023-12-16

## 2023-12-14 RX ORDER — FLUCONAZOLE 150 MG/1
150 TABLET ORAL ONCE
Status: COMPLETED | OUTPATIENT
Start: 2023-12-14 | End: 2023-12-14

## 2023-12-14 RX ORDER — BISACODYL 10 MG
10 SUPPOSITORY, RECTAL RECTAL ONCE AS NEEDED
Status: DISCONTINUED | OUTPATIENT
Start: 2023-12-14 | End: 2023-12-16

## 2023-12-14 RX ORDER — TRANEXAMIC ACID 10 MG/ML
INJECTION, SOLUTION INTRAVENOUS
Status: DISCONTINUED
Start: 2023-12-14 | End: 2023-12-14 | Stop reason: WASHOUT

## 2023-12-14 RX ORDER — ACETAMINOPHEN 500 MG
1000 TABLET ORAL EVERY 6 HOURS PRN
Status: DISCONTINUED | OUTPATIENT
Start: 2023-12-14 | End: 2023-12-16

## 2023-12-14 RX ORDER — DIAPER,BRIEF,INFANT-TODD,DISP
1 EACH MISCELLANEOUS EVERY 6 HOURS PRN
Status: DISCONTINUED | OUTPATIENT
Start: 2023-12-14 | End: 2023-12-16

## 2023-12-14 RX ORDER — MISOPROSTOL 200 UG/1
TABLET ORAL
Status: DISPENSED
Start: 2023-12-14 | End: 2023-12-14

## 2023-12-14 RX ADMIN — BUPIVACAINE HYDROCHLORIDE 5 ML: 2.5 INJECTION, SOLUTION EPIDURAL; INFILTRATION; INTRACAUDAL at 06:02:00

## 2023-12-14 RX ADMIN — LIDOCAINE HYDROCHLORIDE AND EPINEPHRINE 5 ML: 15; 5 INJECTION, SOLUTION EPIDURAL at 06:02:00

## 2023-12-14 RX ADMIN — LIDOCAINE HYDROCHLORIDE 5 ML: 10 INJECTION, SOLUTION INFILTRATION; PERINEURAL at 06:02:00

## 2023-12-14 NOTE — LACTATION NOTE
This note was copied from a baby's chart. LACTATION NOTE - INFANT    Evaluation Type  Evaluation Type: Inpatient    Problems & Assessment  Problems Diagnosed or Identified: Sleepy  Problems: comment/detail: first blood sugar was 32  Infant Assessment: Minimal hunger cues present  Muscle tone: Appropriate for GA    Feeding Assessment  Summary Current Feeding: Breastfeeding with formula supplement  Last 24 hour feeding summary: formula given for low blood sugar and moms plan is to breast fed  Breastfeeding Assessment: Assisted with breastfeeding w/mother's permission;Sleepy infant, quickly pacifies  Breastfeeding Positions: cross cradle;laid back  Latch:  Too sleepy or reluctant, no latch achieved  Audible Sucks/Swallows: None  Type of Nipple: Everted (after stimulation)

## 2023-12-14 NOTE — PROGRESS NOTES
Transferred Mother to room   353    via wheelchair accompanied by S.O., in stable condition. Report given to [facility]. Bed in locked and low position, side rails up x 2, ID's checked and verified, call light with in reach, reinforced pt to call for assistance when needs  to go to the bathroom.

## 2023-12-14 NOTE — PROGRESS NOTES
[facility]    Labor Progress Note    Kathryn Ring Patient Status:  Inpatient    1980 MRN [id number]   Location [address] Attending Fatuma Gonzalez, [address] Day # 0 PCP Saran Cuevas MD     Subjective: Interval History:   Dayna Baker is starting to feel contractions more regularly now. Desires AROM. FOB present and supportive.      Objective:   Vital signs in last 24 hours:  Pulse:  [69-84] 80  BP: (127-152)/(57-73) 140/66    Input/Output:    Intake/Output Summary (Last 24 hours) at 2023 1823  Last data filed at 2023 1010  Gross per 24 hour   Intake --   Output 140 ml   Net -140 ml       Fetal Surveillance:  Fetal heart tones:  140 baseline/moderate variability/accels present/no decels  Uterine contractions:  2-4 minutes apart    Sterile vaginal exam:  Dilation: 6 cm dilation   Effacement: 50%   Station:  -3    Position: Cephalic  Presentation: vertex  AROM, clear fluid    Results:   Lab Results   Component Value Date    TREPONEMALAB Negative 2023    HBVSAG Non-Reactive 2016    ABO O 2023    RH Negative 2023    WBC 9.7 2023    HGB 12.0 2023    HCT 34.9 (L) 2023    .0 2023    CREATSERUM 0.80 2019    BUN 10 2019     2019    K 3.8 2019     2019    CO2 28.0 2019    GLU 92 2019    CA 9.0 2019    ALB 3.9 2019    ALKPHO 65 2019    BILT 0.7 2019    TP 7.4 2019    AST 12 (L) 2019    ALT 22 2019    T4F 0.9 2023    TSH 1.316 2023       Lab Results   Component Value Date    DDIMER <0.27 2017    TROP 0.00 2017    COLORUR Yellow 12/15/2021    CLARITY Hazy (A) 12/15/2021    SPECGRAVITY 1.015 2023    PROUR Negative 12/15/2021    GLUUR Negative 12/15/2021    KETUR Negative 12/15/2021    BILUR Negative 12/15/2021    BLOODURINE Negative 12/15/2021    NITRITE Negative 2023    UROBILINOGEN <2.0 12/15/2021    ESTRADA Wright (A) 12/15/2021       Assessment & Plan:      Advanced maternal age in multigravida, third trimester               NIPT low risk  Normal  testing  Delivery recommended 39-40wks       H/O  section                due to fetal intolerance to ECV               Has had successful        Rh negative, antepartum               Received routine rhogam 10/12/23       History of shoulder dystocia in prior pregnancy, currently pregnant               Shoulder dystocia with third pregnancy/ at 41.1wks and 9#6oz               Had 8#11oz baby with no shoulder                      Other specified hypothyroidism               Stable on levothyroxine       Family history of congenital heart defect               Heart defect in second child that closed without intervention               Normal fetal echo       Group B streptococcal carriage complicating pregnancy               Has been received ampicillin per protocol   Desires diflucan postpartum due to history of yeast infections    Gestational hypertension   Occasional mild range BP since admit   Asymptomatic   CMP and CBC ordered     Claudia Wooten is a 37yo [id number] at 39.2wks admitted for IOL due to [facility]. Pregnancy history significant for Rh negative, AMA, hypothyroidism, history of , history of shoulder dystocia. GBS positive               Cat 1 FHTs               s/p cooks and AROM   Pitocin at 10mu. Continue to titrate as necessary for adequate contractions   Receiving ampicillin per protocol for GBS               Continuous monitoring               Anticipate vaginal delivery               Dr. Kraig Angelucci notified of patient admit         Plan discussed with patient who verbalizes understanding and agreement.     Keri Saint, CNM  2023

## 2023-12-14 NOTE — ANESTHESIA PROCEDURE NOTES
Labor Analgesia    Date/Time: 12/14/2023 6:02 AM    Performed by: Dewayne Shepherd MD  Authorized by: Dewayne Shepherd MD      General Information and Staff    Start Time:  12/14/2023 6:02 AM  End Time:  12/14/2023 6:17 AM  Anesthesiologist:  Dewayne Shepherd MD  Performed by:   Anesthesiologist  Patient Location:  OB  Site Identification: surface landmarks    Reason for Block: labor epidural    Preanesthetic Checklist: patient identified, IV checked, site marked, risks and benefits discussed, monitors and equipment checked, pre-op evaluation, timeout performed, anesthesia consent and sterile technique used      Procedure Details    Patient Position:  Sitting  Prep: ChloraPrep    Monitoring:  Heart rate  Approach:  Midline    Epidural Needle    Injection Technique:  ADINA air  Needle Type:  Tuohy  Needle Gauge:  18 G  Needle Length:  3.5 in  Needle Insertion Depth:  7  Location:  L2-3    Spinal Needle      Catheter    Catheter Type:  Multi-orifice  Catheter Size:  20 G  Catheter at Skin Depth:  13  Test Dose:  Negative    Assessment    Sensory Level:  T6    Additional Comments     Motor intact

## 2023-12-14 NOTE — L&D DELIVERY NOTE
Josué Ferro [[id number]]      Labor Events     labor?: No   steroids?: None  Rupture date/time: 2023 1757     Rupture type: AROM  Fluid color: Clear  Labor type:  Induced Onset of Labor  Induction: Oxytocin, Cervical Ripening Balloon  Indications for induction: Other - comment  Induction comment: AMA  Intrapartum & labor complications: Group B beta strep +       Labor Event Times    Labor onset date/time: 2023 1520  Dilation complete date/time: 202329  Start pushing date/time: 2023 0836       [place] Presentation    Presentation: Vertex  Position: Left Occiput Anterior       Operative Delivery    Operative Vaginal Delivery: No                      Shoulder Dystocia    Shoulder Dystocia: No             Anesthesia    Method: Epidural               Delivery      Head delivery date/time: 2023 09:14:01   Delivery date/time:  23 09:14:15   Delivery type: Vaginal birth after caesarian    Details:     Delivery location: delivery room  Delivery Room Temperature: 72       Delivery Providers    Delivering Clinician: Gonzalez Sethi, [address] [place] [address]   Delivery personnel:  Provider Role   Carisa Diallo, RN Baby Nurse   Leigh Cohen, RN Delivery Nurse   García Shukla Surgical Tech             Cord    Vessels: 3 Vessels  Complications: Nuchal  # of loops: 1  Timed cord clamping: Yes  Time in sec: 61  Cord blood disposition: to lab  Gases sent?: No       Resuscitation    Method: None       [place] Measurements      Weight: 3820 g 8 lb 6.8 oz Length: 50.8 cm     Head circum.: 38 cm                      Placenta    Date/time: 202318  Removal: Spontaneous  Appearance: Intact  Disposition: Discarded       Apgars    Living status: Living   Apgar Scoring Key:    0 1 2    Skin color Blue or pale Acrocyanotic Completely pink    Heart rate Absent <100 bpm >100 bpm    Reflex irritability No response Grimace Cry or active withdrawal    Muscle tone Limp Some flexion Active motion    Respiratory effort Absent Weak cry; hypoventilation Good, crying              1 Minute:  5 Minute:  10 Minute:  15 Minute:  20 Minute:      Skin color: 0  1       Heart rate: 2  2       Reflex irritablity: 2  2       Muscle tone: 2  2       Respiratory effort: 2  2       Total: 8  9          Apgars assigned by: Mendy Michelle RN  [place] disposition: with mother       Skin to Skin    Skin to skin initiated date/time: 2023 0914  Skin to skin with:  Mother       Vaginal Count    Initial count RN: Jaja Anderson RN  Initial count Tech: Rand Chum   Sponges   Sharps    Initial counts 10   0    Final counts 10   1    Final count RN: Jaja Anderson RN  Final count MD: Aneta Westfall CNM       Delivery (Maternal)    Episiotomy: None  Perineal lacerations: 2nd Repaired?: Yes     Vaginal laceration?: No      Cervical laceration?: No    Clitoral laceration?: No    Quantitative blood loss (mL): [address]    Vaginal Delivery Note    Darl Just Patient Status:  Inpatient    1980 MRN [id number]   Location [address] [place] [address] Attending Blane [place], [address] [place] [address] Day # 1 PCP Alejandro Santos MD     Delivery     Infant  Date of Delivery: 2023    Time of Delivery: 9:14 AM   Delivery Type: Vaginal birth after caesarian     Infant Sex/Birthweight: male 8 lb 6.8 oz (3.82 kg)     Presentation Vertex [1]   Position Left [1]  Occiput [1]  Anterior [1]     Apgars:  1 minute: 8                5 minutes: 9                         10 minutes:      Placenta  Date/Time of Delivery: 2023  9:18 AM    Delivery: spontaneous  Placenta to Pathology: no  Cord Gases Submitted: no  Cord Blood Collection: yes  Cord Tissue Collection: yes  Cord Complications: single nuchal  Sponge and Needle Counts:  Verified yes    Maternal Anesthesia: epidural   Episiotomy/Laceration Repair  Laceration: perineal 2nd degree    Delivery Complications  none    Neonatologist Present: no  Delivery Comment:   Apolonia Colin progressed to complete dilatation and +3 station prior to pushing successfully to viable baby boy. See Delivery Summary above for time, APGARs, and weight. Fetal head presented in the CHET position. Sweep for nuchal cord was performed and no nuchal cord identified. Anterior shoulder delivered spontaneously without traction. Baby vigorous at birth. To maternal abdomen for dry and stimulation then cord cut after pulsing ceased. Prophylactic IV pitocin initiated in 3rd stage. Spontaneous placenta by Norm Bran mechanism, complete with 3 vessel cord. Hemostasis achieved by fundal massage and prophylactic IV Pitocin. Vagina and perineum inspected and 2nd degree perineal laceration noted and repaired with 2.0 Chromic under epidural anesthesia. Mother and infant appeared in stable condition when midwife left the delivery room. Sharps and 4x4 counts were correct. Skin to skin initiated.     Quantitative Blood Loss (mL) 253         Kamla Hussein CNM   12/14/2023  9:54 AM

## 2023-12-14 NOTE — LACTATION NOTE
LACTATION NOTE - MOTHER      Evaluation Type: Inpatient    Problems identified  Problems identified: Knowledge deficit    Maternal history  Maternal history: AMA;Obesity    Breastfeeding goal  Breastfeeding goal: To maintain breast milk feeding per patient goal    Maternal Assessment  Bilateral Breasts: Soft;Symmetrical  Bilateral Nipples: Large;Colostrum easily expressed  Prior breastfeeding experience (comment below): Multip; Successful  Breastfeeding Assistance: Breastfeeding assistance provided with permission;Pumping assistance provided with permission;Hand expression provided with permission;Breast exam provided with permission    Pain assessment  Location/Comment: denies  Treatment of Sore Nipples: Lanolin    Guidelines for use of:  Breast pump type: Hand Pump                Patient states had issues with low supply in the past.  All three of her children had tongue tie and needed to have frenectomy, one had to have in  adolescence before could have braces. Attempted to breastfeed. Infant sleepy. Encouraged STS. Patient return demonstrated hand expression and spoon feeding. Discussed normal NB behavior. Encouraged to call [facility] if assistance with breastfeeding is needed. Given hand pump and educated on use.

## 2023-12-15 LAB
BASOPHILS # BLD AUTO: 0.04 X10(3) UL (ref 0–0.2)
BASOPHILS NFR BLD AUTO: 0.4 %
DEPRECATED RDW RBC AUTO: 46.4 FL (ref 35.1–46.3)
EOSINOPHIL # BLD AUTO: 0.08 X10(3) UL (ref 0–0.7)
EOSINOPHIL NFR BLD AUTO: 0.7 %
ERYTHROCYTE [DISTWIDTH] IN BLOOD BY AUTOMATED COUNT: 13.6 % (ref 11–15)
HCT VFR BLD AUTO: 27.8 %
HGB BLD-MCNC: 9.3 G/DL
IMM GRANULOCYTES # BLD AUTO: 0.09 X10(3) UL (ref 0–1)
IMM GRANULOCYTES NFR BLD: 0.8 %
LYMPHOCYTES # BLD AUTO: 3.24 X10(3) UL (ref 1–4)
LYMPHOCYTES NFR BLD AUTO: 29.4 %
MCH RBC QN AUTO: 31.6 PG (ref 26–34)
MCHC RBC AUTO-ENTMCNC: 33.5 G/DL (ref 31–37)
MCV RBC AUTO: 94.6 FL
MONOCYTES # BLD AUTO: 0.8 X10(3) UL (ref 0.1–1)
MONOCYTES NFR BLD AUTO: 7.3 %
NEUTROPHILS # BLD AUTO: 6.78 X10 (3) UL (ref 1.5–7.7)
NEUTROPHILS # BLD AUTO: 6.78 X10(3) UL (ref 1.5–7.7)
NEUTROPHILS NFR BLD AUTO: 61.4 %
PLATELET # BLD AUTO: 177 10(3)UL (ref 150–450)
RBC # BLD AUTO: 2.94 X10(6)UL
WBC # BLD AUTO: 11 X10(3) UL (ref 4–11)

## 2023-12-15 PROCEDURE — 85025 COMPLETE CBC W/AUTO DIFF WBC: CPT | Performed by: ADVANCED PRACTICE MIDWIFE

## 2023-12-15 RX ORDER — CHOLECALCIFEROL (VITAMIN D3) 25 MCG
1 TABLET,CHEWABLE ORAL DAILY
Status: DISCONTINUED | OUTPATIENT
Start: 2023-12-15 | End: 2023-12-16

## 2023-12-15 NOTE — PLAN OF CARE
Problem: Patient Centered Care  Goal: Patient preferences are identified and integrated in the patient's plan of care  Description: Interventions:  - What would you like us to know as we care for you?   - Provide timely, complete, and accurate information to patient/family  - Incorporate patient and family knowledge, values, beliefs, and cultural backgrounds into the planning and delivery of care  - Encourage patient/family to participate in care and decision-making at the level they choose  - Honor patient and family perspectives and choices  12/15/2023 0842 by Owen Pyle RN  Outcome: Progressing  12/15/2023 0829 by Owen Pyle RN  Outcome: Progressing     Problem: Patient/Family Goals  Goal: Patient/Family Long Term Goal  Description: Patient's Long Term Goal:     Interventions:  -   - See additional Care Plan goals for specific interventions  12/15/2023 0842 by Owen Pyle RN  Outcome: Progressing  12/15/2023 0829 by Owen Pyle RN  Outcome: Progressing  Goal: Patient/Family Short Term Goal  Description: Patient's Short Term Goal:     Interventions:   -   - See additional Care Plan goals for specific interventions  12/15/2023 0842 by Owen Pyle RN  Outcome: Progressing  12/15/2023 0829 by Owen Pyle RN  Outcome: Progressing     Problem: POSTPARTUM  Goal: Long Term Goal:Experiences normal postpartum course  Description: INTERVENTIONS:  - Assess and monitor vital signs and lab values. - Assess fundus and lochia. - Provide ice/sitz baths for perineum discomfort. - Monitor healing of incision/episiotomy/laceration, and assess for signs and symptoms of infection and hematoma. - Assess bladder function and monitor for bladder distention.  - Provide/instruct/assist with pericare as needed. - Provide VTE prophylaxis as needed. - Monitor bowel function.  - Encourage ambulation and provide assistance as needed.   - Assess and monitor emotional status and provide social service/psych resources as needed. - Utilize standard precautions and use personal protective equipment as indicated. Ensure aseptic care of all intravenous lines and invasive tubes/drains.  - Obtain immunization and exposure to communicable diseases history. 12/15/2023 0842 by Wyatt Lawrence RN  Outcome: Progressing  12/15/2023 0829 by Wyatt Lawrence RN  Outcome: Progressing  Goal: Optimize infant feeding at the breast  Description: INTERVENTIONS:  - Initiate breast feeding within first hour after birth. - Monitor effectiveness of current breast feeding efforts. - Assess support systems available to mother/family.  - Identify cultural beliefs/practices regarding lactation, letdown techniques, maternal food preferences. - Assess mother's knowledge and previous experience with breast feeding.  - Provide information as needed about early infant feeding cues (e.g., rooting, lip smacking, sucking fingers/hand) versus late cue of crying.  - Discuss/demonstrate breast feeding aids (e.g., infant sling, nursing footstool/pillows, and breast pumps). - Encourage mother/other family members to express feelings/concerns, and actively listen. - Educate father/SO about benefits of breast feeding and how to manage common lactation challenges. - Recommend avoidance of specific medications or substances incompatible with breast feeding.  - Assess and monitor for signs of nipple pain/trauma. - Instruct and provide assistance with proper latch. - Review techniques for milk expression (breast pumping) and storage of breast milk. Provide pumping equipment/supplies, instructions and assistance, as needed. - Encourage rooming-in and breast feeding on demand.  - Encourage skin-to-skin contact. - Provide LC support as needed. - Assess for and manage engorgement. - Provide breast feeding education handouts and information on community breast feeding support.    12/15/2023 0842 by Wyatt Lawrence RN  Outcome: Progressing  12/15/2023 0829 by Lois Cobian RN  Outcome: Progressing  Goal: Establishment of adequate milk supply with medication/procedure interruptions  Description: INTERVENTIONS:  - Review techniques for milk expression (breast pumping). - Provide pumping equipment/supplies, instructions, and assistance until it is safe to breastfeed infant. 12/15/2023 0842 by Lois Cobian RN  Outcome: Progressing  12/15/2023 0829 by Lois Cobian RN  Outcome: Progressing  Goal: Experiences normal breast weaning course  Description: INTERVENTIONS:  - Assess for and manage engorgement. - Instruct on breast care. - Provide comfort measures. 12/15/2023 0842 by Lois Cobian RN  Outcome: Progressing  12/15/2023 0829 by Lois Cobian RN  Outcome: Progressing  Goal: Appropriate maternal -  bonding  Description: INTERVENTIONS:  - Assess caregiver- interactions. - Assess caregiver's emotional status and coping mechanisms. - Encourage caregiver to participate in  daily care. - Assess support systems available to mother/family.  - Provide /case management support as needed.   12/15/2023 0842 by Lois Cobian RN  Outcome: Progressing  12/15/2023 0829 by Lois Cobian RN  Outcome: Progressing

## 2023-12-15 NOTE — PROGRESS NOTES
The patient had a male infant, and desires circumcision. She understands there is no medical indication for circumcision. We discussed AAP opinion on procedure as well. She was consented for infant circumcision risks including, but not limited to: bleeding, infection, trauma to other tissue, and need for further procedures. The patient expressed understanding, questions were answered and she  wishes to proceed with the procedure for her son.      Terese Moscoso, DO

## 2023-12-16 VITALS
BODY MASS INDEX: 33 KG/M2 | TEMPERATURE: 98 F | WEIGHT: 210 LBS | SYSTOLIC BLOOD PRESSURE: 133 MMHG | DIASTOLIC BLOOD PRESSURE: 75 MMHG | OXYGEN SATURATION: 98 % | RESPIRATION RATE: 16 BRPM | HEART RATE: 62 BPM

## 2023-12-16 RX ORDER — PSEUDOEPHEDRINE HCL 30 MG
100 TABLET ORAL 2 TIMES DAILY PRN
Qty: 20 CAPSULE | Refills: 0 | Status: SHIPPED | OUTPATIENT
Start: 2023-12-16 | End: 2023-12-26

## 2023-12-16 RX ORDER — IBUPROFEN 600 MG/1
600 TABLET ORAL EVERY 6 HOURS PRN
Qty: 40 TABLET | Refills: 0 | Status: SHIPPED | OUTPATIENT
Start: 2023-12-16 | End: 2023-12-26

## 2023-12-16 RX ORDER — MELATONIN
325 EVERY OTHER DAY
Qty: 22 TABLET | Refills: 0 | Status: SHIPPED | OUTPATIENT
Start: 2023-12-16 | End: 2024-01-30

## 2023-12-16 RX ORDER — FLUCONAZOLE 150 MG/1
150 TABLET ORAL ONCE
Qty: 1 TABLET | Refills: 0 | Status: SHIPPED | OUTPATIENT
Start: 2023-12-16 | End: 2023-12-16

## 2023-12-16 NOTE — LACTATION NOTE
12/15/23 0900   Evaluation Type   Evaluation Type Inpatient   Problems identified   Problems identified Knowledge deficit   Problems Identified Other Follow up lactation visit. Pt verbalized no concerns presently, reviewed few appropriate lactation related questions. Breastfeeding goal   Breastfeeding goal To maintain breast milk feeding per patient goal   Maternal Assessment   Prior breastfeeding experience (comment below) Multip; Successful   Breastfeeding Assistance LC assistance declined at this time   Guidelines for use of: Other (comment) Lactation contact information provided for assistance as needed.

## 2023-12-16 NOTE — LACTATION NOTE
12/15/23 0900   Evaluation Type   Evaluation Type Inpatient   Problems identified   Problems identified Knowledge deficit   Problems Identified Other Follow up lactation visit. Pt verbalized no concerns presently, reviewed few appropriate lactation related questions. Breastfeeding goal   Breastfeeding goal To maintain breast milk feeding per patient goal   Maternal Assessment   Prior breastfeeding experience (comment below) Multip; Successful   Breastfeeding Assistance LC assistance declined at this time   Guidelines for use of:   Breast pump type Hand Pump   Current use of pump: manual pump at pt bedside, offered/recommended hospital grade pump use with supplementation, pt declined at this time   Suggested use of pump Pump each time a supplement is offered   Other (comment) Lactation contact information provided for assistance as needed.

## 2023-12-16 NOTE — PROGRESS NOTES
Discharge Note  Discharge order received from MD. IV removed. Aware of follow up appointments. Discussed what to expect after discharge and when to call physician. went over discharge AVS with patient. Patient states understanding. Pt aware of pelvic rest for 6 weeks. Pt wheel chaired down. Prescriptions were sent to pharmacy. Electronically sent prescriptions: diflucan/motrin/colace./iron  Printed Scripts: none      Aware to take iron once filled. Notifed Josi Whitten that iron iv not given due to patient iv loss. Witnessed mom sign release of custody form for infant.

## 2023-12-16 NOTE — DISCHARGE SUMMARY
[facility]    Discharge Summary    Craige Kayser Patient Status:  Inpatient    1980 MRN [id number]   Location [facility] 3SE Attending Michelet Carr, [address] Day # 3       Delivering OB Clinician: Perico Dinero CNM    [facility]: Estimated Date of Delivery: 23    Gestational Age: 38w3d    Antepartum complications:   Patient Active Problem List   Diagnosis    Family history of malignant neoplasm of breast    Dense breasts    Recurrent herpes labialis    H/O  section    Rh negative, antepartum    GERD (gastroesophageal reflux disease)    History of shoulder dystocia in prior pregnancy, currently pregnant    Other specified hypothyroidism    Family history of congenital heart defect    Pregnant    Group B streptococcal carriage complicating pregnancy    Gestational hypertension     (spontaneous vaginal delivery)    Postpartum anemia       Date of Delivery: 2023  Time of Delivery: 9:14 AM     Delivery Type: vaginal birth after  ()    Baby: Liveborn male   Information for the patient's :  Geeta Cary [[id number]]   8 lb 6.8 oz (3.82 kg)   Apgars:  1 minute: 8   5 minutes: 9 10 minutes:       Intrapartum Complications: PIH    Admit Date: 2023    Discharge Date: 2023    Hospital Course: No complications Routine delivery and postpartum care    Discharged Condition: stable    Disposition: home    Plan:     Follow-up appointment in 3 days for a BP check, then 2 weeks and 6 weeks with Perico Dinero, [address] [place] [address]  2023  10:02 AM

## 2023-12-16 NOTE — ANESTHESIA POSTPROCEDURE EVALUATION
Patient: Kathryn Ring    Procedure Summary       Date: 12/14/23 Room / Location:     Anesthesia Start: 14 [facility] Anesthesia Stop: 7249    Procedure: LABOR ANALGESIA Diagnosis:     Scheduled Providers:  Anesthesiologist: Rach Hoskins MD    Anesthesia Type: epidural ASA Status: 2            Anesthesia Type: epidural    Vitals Value Taken Time   /59 12/14/23 1015   Temp  12/16/23 0846   Pulse 86 12/14/23 1015   Resp  12/16/23 0846   SpO2 98 % 12/14/23 1000       [facility] AN Post Evaluation:   Patient Evaluated in PACU  Patient Participation: complete - patient participated  Level of Consciousness: awake  Pain Score: 0  Pain Management: adequate  Airway Patency:patent  Dental exam unchanged from preop  Yes    Cardiovascular Status: acceptable  Respiratory Status: acceptable  Postoperative Hydration acceptable      Julianna Castro MD  12/16/2023 8:46 AM

## 2023-12-18 ENCOUNTER — TELEPHONE (OUTPATIENT)
Dept: OBGYN CLINIC | Facility: CLINIC | Age: 43
End: 2023-12-18

## 2023-12-18 NOTE — TELEPHONE ENCOUNTER
Pt Name and  verified. Pt states that she did see a large blood clot. She states that she noticed it wasn't as big as a golf ball, but it was a little bigger than what she expected. Pt did take ibuprofen last night, and just wanted to make sure that this was okay to take. Advised she can continue to take the Ibuprofen. Went over bleeding precautions and when to call the office or go to ED.  THALIA

## 2023-12-19 ENCOUNTER — HOSPITAL ENCOUNTER (EMERGENCY)
Facility: HOSPITAL | Age: 43
Discharge: HOME | End: 2023-12-19
Attending: EMERGENCY MEDICINE
Payer: COMMERCIAL

## 2023-12-19 ENCOUNTER — LAB ENCOUNTER (OUTPATIENT)
Dept: LAB | Facility: HOSPITAL | Age: 43
End: 2023-12-19
Attending: ADVANCED PRACTICE MIDWIFE
Payer: COMMERCIAL

## 2023-12-19 ENCOUNTER — NURSE ONLY (OUTPATIENT)
Dept: OBGYN CLINIC | Facility: CLINIC | Age: 43
End: 2023-12-19

## 2023-12-19 VITALS
DIASTOLIC BLOOD PRESSURE: 68 MMHG | WEIGHT: 210 LBS | HEART RATE: 55 BPM | SYSTOLIC BLOOD PRESSURE: 154 MMHG | TEMPERATURE: 99 F | RESPIRATION RATE: 19 BRPM | OXYGEN SATURATION: 97 % | HEIGHT: 67 IN | BODY MASS INDEX: 32.96 KG/M2

## 2023-12-19 VITALS — DIASTOLIC BLOOD PRESSURE: 72 MMHG | HEART RATE: 60 BPM | SYSTOLIC BLOOD PRESSURE: 158 MMHG

## 2023-12-19 DIAGNOSIS — E06.3 HYPOTHYROIDISM DUE TO HASHIMOTO'S THYROIDITIS: ICD-10-CM

## 2023-12-19 DIAGNOSIS — R03.0 ELEVATED BP WITHOUT DIAGNOSIS OF HYPERTENSION: Primary | ICD-10-CM

## 2023-12-19 DIAGNOSIS — E03.8 HYPOTHYROIDISM DUE TO HASHIMOTO'S THYROIDITIS: ICD-10-CM

## 2023-12-19 LAB
ALBUMIN SERPL-MCNC: 3.6 G/DL (ref 3.2–4.8)
ALBUMIN SERPL-MCNC: 3.7 G/DL (ref 3.2–4.8)
ALBUMIN/GLOB SERPL: 1.6 {RATIO} (ref 1–2)
ALP LIVER SERPL-CCNC: 118 U/L
ALP LIVER SERPL-CCNC: 122 U/L
ALT SERPL-CCNC: 42 U/L
ALT SERPL-CCNC: 44 U/L
ANION GAP SERPL CALC-SCNC: 3 MMOL/L (ref 0–18)
ANION GAP SERPL CALC-SCNC: 6 MMOL/L (ref 0–18)
AST SERPL-CCNC: 27 U/L (ref ?–34)
AST SERPL-CCNC: 28 U/L (ref ?–34)
BASOPHILS # BLD AUTO: 0.05 X10(3) UL (ref 0–0.2)
BASOPHILS NFR BLD AUTO: 0.6 %
BILIRUB DIRECT SERPL-MCNC: <0.1 MG/DL (ref ?–0.3)
BILIRUB SERPL-MCNC: 0.3 MG/DL (ref 0.3–1.2)
BILIRUB SERPL-MCNC: 0.3 MG/DL (ref 0.3–1.2)
BILIRUB UR QL: NEGATIVE
BUN BLD-MCNC: 12 MG/DL (ref 9–23)
BUN BLD-MCNC: 9 MG/DL (ref 9–23)
BUN/CREAT SERPL: 12.5 (ref 10–20)
BUN/CREAT SERPL: 15.2 (ref 10–20)
CALCIUM BLD-MCNC: 9.2 MG/DL (ref 8.7–10.4)
CALCIUM BLD-MCNC: 9.4 MG/DL (ref 8.7–10.4)
CHLORIDE SERPL-SCNC: 109 MMOL/L (ref 98–112)
CHLORIDE SERPL-SCNC: 109 MMOL/L (ref 98–112)
CHOLEST SERPL-MCNC: 216 MG/DL (ref ?–200)
CLARITY UR: CLEAR
CO2 SERPL-SCNC: 26 MMOL/L (ref 21–32)
CO2 SERPL-SCNC: 26 MMOL/L (ref 21–32)
CREAT BLD-MCNC: 0.72 MG/DL
CREAT BLD-MCNC: 0.79 MG/DL
CREAT UR-SCNC: 19.2 MG/DL
DEPRECATED RDW RBC AUTO: 45.4 FL (ref 35.1–46.3)
DEPRECATED RDW RBC AUTO: 47.4 FL (ref 35.1–46.3)
EGFRCR SERPLBLD CKD-EPI 2021: 106 ML/MIN/1.73M2 (ref 60–?)
EGFRCR SERPLBLD CKD-EPI 2021: 95 ML/MIN/1.73M2 (ref 60–?)
EOSINOPHIL # BLD AUTO: 0.19 X10(3) UL (ref 0–0.7)
EOSINOPHIL NFR BLD AUTO: 2.2 %
ERYTHROCYTE [DISTWIDTH] IN BLOOD BY AUTOMATED COUNT: 13.3 % (ref 11–15)
ERYTHROCYTE [DISTWIDTH] IN BLOOD BY AUTOMATED COUNT: 13.3 % (ref 11–15)
FASTING STATUS PATIENT QL REPORTED: NO
GLOBULIN PLAS-MCNC: 2.3 G/DL (ref 2.8–4.4)
GLUCOSE BLD-MCNC: 75 MG/DL (ref 70–99)
GLUCOSE BLD-MCNC: 86 MG/DL (ref 70–99)
GLUCOSE UR-MCNC: NORMAL MG/DL
HCT VFR BLD AUTO: 32.5 %
HCT VFR BLD AUTO: 33.7 %
HDLC SERPL-MCNC: 44 MG/DL (ref 40–59)
HGB BLD-MCNC: 10.7 G/DL
HGB BLD-MCNC: 10.8 G/DL
IMM GRANULOCYTES # BLD AUTO: 0.23 X10(3) UL (ref 0–1)
IMM GRANULOCYTES NFR BLD: 2.6 %
KETONES UR-MCNC: NEGATIVE MG/DL
LDLC SERPL CALC-MCNC: 91 MG/DL (ref ?–100)
LEUKOCYTE ESTERASE UR QL STRIP.AUTO: 75
LYMPHOCYTES # BLD AUTO: 2.48 X10(3) UL (ref 1–4)
LYMPHOCYTES NFR BLD AUTO: 28.2 %
MCH RBC QN AUTO: 31.2 PG (ref 26–34)
MCH RBC QN AUTO: 31.4 PG (ref 26–34)
MCHC RBC AUTO-ENTMCNC: 31.8 G/DL (ref 31–37)
MCHC RBC AUTO-ENTMCNC: 33.2 G/DL (ref 31–37)
MCV RBC AUTO: 94.5 FL
MCV RBC AUTO: 98.3 FL
MONOCYTES # BLD AUTO: 0.53 X10(3) UL (ref 0.1–1)
MONOCYTES NFR BLD AUTO: 6 %
NEUTROPHILS # BLD AUTO: 5.3 X10 (3) UL (ref 1.5–7.7)
NEUTROPHILS # BLD AUTO: 5.3 X10(3) UL (ref 1.5–7.7)
NEUTROPHILS NFR BLD AUTO: 60.4 %
NITRITE UR QL STRIP.AUTO: NEGATIVE
NONHDLC SERPL-MCNC: 172 MG/DL (ref ?–130)
OSMOLALITY SERPL CALC.SUM OF ELEC: 283 MOSM/KG (ref 275–295)
OSMOLALITY SERPL CALC.SUM OF ELEC: 291 MOSM/KG (ref 275–295)
PH UR: 6 [PH] (ref 5–8)
PLATELET # BLD AUTO: 234 10(3)UL (ref 150–450)
PLATELET # BLD AUTO: 234 10(3)UL (ref 150–450)
POTASSIUM SERPL-SCNC: 3.8 MMOL/L (ref 3.5–5.1)
POTASSIUM SERPL-SCNC: 3.9 MMOL/L (ref 3.5–5.1)
PROT SERPL-MCNC: 5.9 G/DL (ref 5.7–8.2)
PROT SERPL-MCNC: 6 G/DL (ref 5.7–8.2)
PROT UR-MCNC: <6 MG/DL (ref ?–14)
PROT UR-MCNC: NEGATIVE MG/DL
RBC # BLD AUTO: 3.43 X10(6)UL
RBC # BLD AUTO: 3.44 X10(6)UL
RBC #/AREA URNS AUTO: >10 /HPF
SODIUM SERPL-SCNC: 138 MMOL/L (ref 136–145)
SODIUM SERPL-SCNC: 141 MMOL/L (ref 136–145)
SP GR UR STRIP: 1.01 (ref 1–1.03)
T4 FREE SERPL-MCNC: 1 NG/DL (ref 0.8–1.7)
TRIGL SERPL-MCNC: 491 MG/DL (ref 30–149)
TROPONIN I SERPL HS-MCNC: 48 NG/L
TSI SER-ACNC: 3.71 MIU/ML (ref 0.55–4.78)
UROBILINOGEN UR STRIP-ACNC: NORMAL
VLDLC SERPL CALC-MCNC: 82 MG/DL (ref 0–30)
WBC # BLD AUTO: 8.7 X10(3) UL (ref 4–11)
WBC # BLD AUTO: 8.8 X10(3) UL (ref 4–11)

## 2023-12-19 PROCEDURE — 3077F SYST BP >= 140 MM HG: CPT | Performed by: ADVANCED PRACTICE MIDWIFE

## 2023-12-19 PROCEDURE — 80061 LIPID PANEL: CPT | Performed by: EMERGENCY MEDICINE

## 2023-12-19 PROCEDURE — 84484 ASSAY OF TROPONIN QUANT: CPT | Performed by: EMERGENCY MEDICINE

## 2023-12-19 PROCEDURE — 99285 EMERGENCY DEPT VISIT HI MDM: CPT

## 2023-12-19 PROCEDURE — 80053 COMPREHEN METABOLIC PANEL: CPT

## 2023-12-19 PROCEDURE — 36415 COLL VENOUS BLD VENIPUNCTURE: CPT

## 2023-12-19 PROCEDURE — 3078F DIAST BP <80 MM HG: CPT | Performed by: ADVANCED PRACTICE MIDWIFE

## 2023-12-19 PROCEDURE — 81001 URINALYSIS AUTO W/SCOPE: CPT | Performed by: EMERGENCY MEDICINE

## 2023-12-19 PROCEDURE — 99283 EMERGENCY DEPT VISIT LOW MDM: CPT

## 2023-12-19 PROCEDURE — 84156 ASSAY OF PROTEIN URINE: CPT

## 2023-12-19 PROCEDURE — 84439 ASSAY OF FREE THYROXINE: CPT

## 2023-12-19 PROCEDURE — 85025 COMPLETE CBC W/AUTO DIFF WBC: CPT | Performed by: EMERGENCY MEDICINE

## 2023-12-19 PROCEDURE — 87086 URINE CULTURE/COLONY COUNT: CPT | Performed by: EMERGENCY MEDICINE

## 2023-12-19 PROCEDURE — 85027 COMPLETE CBC AUTOMATED: CPT

## 2023-12-19 PROCEDURE — 82570 ASSAY OF URINE CREATININE: CPT

## 2023-12-19 PROCEDURE — 84443 ASSAY THYROID STIM HORMONE: CPT

## 2023-12-19 PROCEDURE — 82248 BILIRUBIN DIRECT: CPT

## 2023-12-19 RX ORDER — LABETALOL 200 MG/1
200 TABLET, FILM COATED ORAL 2 TIMES DAILY
Qty: 60 TABLET | Refills: 1 | Status: SHIPPED | OUTPATIENT
Start: 2023-12-19

## 2023-12-19 RX ORDER — HYDRALAZINE HYDROCHLORIDE 20 MG/ML
10 INJECTION INTRAMUSCULAR; INTRAVENOUS ONCE
Status: DISCONTINUED | OUTPATIENT
Start: 2023-12-19 | End: 2023-12-20

## 2023-12-19 NOTE — PROGRESS NOTES
Subjective:   Patient ID: Velia Malone is a 37year old female. Deidra Gracia initially presented for postpartum blood pressure check 5 days after deliver of baby boy. Gestational hypertension was diagnosed in labor. Labs were normal and she was asymptomatic. I was called to room after RN took blood pressure and it was 164/81 on automatic machine and then 158/72 manually. She denies headache, vision changes, epigastric pain. Reports bleeding has been minimal. Yesterday morning and this morning she passed a clot each day that was about quarter sized. She denies pelvic pain, foul odor. Breastfeeding is going well. She reports moods are good, though she is tired. History/Other:   Review of Systems   All other systems reviewed and are negative. Current Outpatient Medications   Medication Sig Dispense Refill    labetalol 200 MG Oral Tab Take 1 tablet (200 mg total) by mouth 2 (two) times daily. 60 tablet 1    docusate sodium 100 MG Oral Cap Take 100 mg by mouth 2 (two) times daily as needed for constipation. 20 capsule 0    ibuprofen 600 MG Oral Tab Take 1 tablet (600 mg total) by mouth every 6 (six) hours as needed for Pain. 40 tablet 0    ferrous sulfate 325 (65 FE) MG Oral Tab EC Take 1 tablet (325 mg total) by mouth every other day. 22 tablet 0    aspirin 81 MG Oral Chew Tab Chew by mouth daily. levothyroxine 50 MCG Oral Tab Take 1 tablet (50 mcg total) by mouth before breakfast. 90 tablet 3    montelukast 10 MG Oral Tab       Prenatal Vit-Fe Fumarate-FA (SM PRENATAL VITAMINS) 28-0.8 MG Oral Tab Take by mouth. Allergies:No Known Allergies    Objective:   Physical Exam  Vitals and nursing note reviewed. Constitutional:       General: She is not in acute distress. Appearance: Normal appearance. She is not ill-appearing, toxic-appearing or diaphoretic. Cardiovascular:      Pulses: Normal pulses.    Pulmonary:      Effort: Pulmonary effort is normal.   Neurological:      Mental Status: She is alert and oriented to person, place, and time. Psychiatric:         Mood and Affect: Mood normal.         Behavior: Behavior normal.         Thought Content: Thought content normal.         Judgment: Judgment normal.     Blood pressure was taken a third time automatically and was 154/76    Assessment & Plan:   1. Postpartum hypertension        Orders Placed This Encounter   Procedures    Comp Metabolic Panel (14)    Urine Protein/Creatinine Ratio, Random, OB    CBC, Platelet; No Differential       Meds This Visit:  Requested Prescriptions     Signed Prescriptions Disp Refills    labetalol 200 MG Oral Tab 60 tablet 1     Sig: Take 1 tablet (200 mg total) by mouth 2 (two) times daily. Imaging & Referrals:  None    Consulted with Dr. Miriam Otero who recommended CBC, CMP and P/C ratio in the lab and initiation of 200mg labetalol BID. Discussed plan with patient. She has a BP cuff at home. Instructed her to take BP twice daily. 1 hour after labetalol in the morning and before labetalol in the evening.  Instructed her to call with HA, vision changes, epigastric pain or BP >140/90    Follow-up in person for 2 week postpartum visit

## 2023-12-19 NOTE — PROGRESS NOTES
Yara Benitez presents for a 3 day pp blood pressure check. She delivered a baby boy on 12/14. Her initial blood pressure was taken with a vitals machine and read 164/81 using a red cuff. Her blood pressure was then taken manually with a reading of 158/72 using a red cuff. Denies headache, denies blurred vision, is not on any BP meds. CNM on call Stephanie Carr updated, and will come see patient.

## 2023-12-20 ENCOUNTER — TELEPHONE (OUTPATIENT)
Dept: ENDOCRINOLOGY CLINIC | Facility: CLINIC | Age: 43
End: 2023-12-20

## 2023-12-20 DIAGNOSIS — E03.8 HYPOTHYROIDISM DUE TO HASHIMOTO'S THYROIDITIS: Primary | ICD-10-CM

## 2023-12-20 DIAGNOSIS — E06.3 HYPOTHYROIDISM DUE TO HASHIMOTO'S THYROIDITIS: Primary | ICD-10-CM

## 2023-12-20 NOTE — TELEPHONE ENCOUNTER
Please call patient - received labs from ER regarding elevated cholesterol. Does she have a PCP? If not then let plan to recheck lipids in 3 months. Thanks.

## 2023-12-20 NOTE — ED INITIAL ASSESSMENT (HPI)
Pt arrives through triage with complaints of elevated /80- pt is Post partum 5 days. Pt was seen for a follow up appointment. Pt denies dizziness/ or HA. Pt denies CP/SOB.     Pt was advised to come in by her midwives for the elevated BP after taking 200mg labetalol around 1700

## 2023-12-20 NOTE — ED QUICK NOTES
Patient safe to DC home per MD. Yin Solo to dress self. DC teaching done, instructions reviewed with patient including when and how to follow up with healthcare providers and when to seek emergency care. The patient verbalizes understanding. Peripheral IV discontinued. Patient ambulatory with steady gait to exit.

## 2023-12-20 NOTE — TELEPHONE ENCOUNTER
Called patient and relayed below message. Pt states her PCP is Dr. Sukhdeep Richards. RN advised her to contact Dr. Dunlap Leisure office regarding high cholesterol level. Dr. Beata Castro - RN read your [place]  message to patient regarding TSH result. She said she only missed 2 days of levothyroxine 50 mcg because she delivered her baby 12/14/23. Pt reports she developed post partum HTN and thinks that this contributed to her levels being off. Labs were done in ER because she was directed to go to ER because of her BP. Result Care Coordination      Patient Communication     Edit Comments   Add Notifications  Back to Top    Good afternoon,  Your TSH has increased on recent labs. Did you miss any doses?   Dr. Beata Castro   Written by Haja Reyes MD on 12/20/2023  1:49 PM CST

## 2023-12-26 ENCOUNTER — POSTPARTUM (OUTPATIENT)
Dept: OBGYN CLINIC | Facility: CLINIC | Age: 43
End: 2023-12-26

## 2023-12-26 VITALS
HEIGHT: 67 IN | WEIGHT: 196 LBS | DIASTOLIC BLOOD PRESSURE: 76 MMHG | BODY MASS INDEX: 30.76 KG/M2 | SYSTOLIC BLOOD PRESSURE: 128 MMHG | HEART RATE: 66 BPM

## 2023-12-26 PROCEDURE — 3074F SYST BP LT 130 MM HG: CPT | Performed by: ADVANCED PRACTICE MIDWIFE

## 2023-12-26 PROCEDURE — 3078F DIAST BP <80 MM HG: CPT | Performed by: ADVANCED PRACTICE MIDWIFE

## 2023-12-26 PROCEDURE — 3008F BODY MASS INDEX DOCD: CPT | Performed by: ADVANCED PRACTICE MIDWIFE

## 2023-12-26 PROCEDURE — 99212 OFFICE O/P EST SF 10 MIN: CPT | Performed by: ADVANCED PRACTICE MIDWIFE

## 2023-12-27 NOTE — PROGRESS NOTES
Subjective: Jennifer Li is 2 weeks postpartum after a vaginal delivery of a baby boy complicated by gestational hypertension. On 1/19 she presented to the ER with severe range Bps 2.5 hours after taking labetalol dose. She had elevated troponin's but eventually her BP came down in the ER so they sent her home. They did not give her any additional meds for her BP. Since then she has been taking the labetalol 200mg BID but reports Bps are still mostly in the 150s/70-80s throughout the day. Sometimes when she feels anxious about her blood pressure she feels her heart beating very hard. Denies palpitations, chest pain or feeling of heart racing. See L&D delivery summary for birth statistics. She reports she is otherwise feeling very well. Bleeding is decreasing and not experiencing any excessive pain. Breastfeeding successfully and reports infant is experiencing adequate weight gain. Reports her milk just started really coming in the past few days. She denies any signs/symptoms of postpartum depression and has adequate family support. Denies any abuse. Contraceptive plan: considering POPs as a bridge to vasectomy    Review of Systems   Constitutional:  Negative for chills and fever. Eyes:  Negative for blurred vision. Respiratory:  Negative for cough, shortness of breath and wheezing. Cardiovascular:  Negative for chest pain, palpitations and leg swelling. Gastrointestinal:  Negative for diarrhea, nausea and vomiting. Genitourinary:  Negative for dysuria and frequency. Neurological:  Negative for dizziness and headaches. Psychiatric/Behavioral:  Negative for depression and suicidal ideas.         Objective:   Vitals:    12/26/23 1332   BP: 128/76   Pulse: 66     Wt Readings from Last 6 Encounters:   12/26/23 196 lb (88.9 kg)   12/19/23 210 lb (95.3 kg)   12/13/23 210 lb (95.3 kg)   12/12/23 210 lb (95.3 kg)   12/07/23 209 lb 3.2 oz (94.9 kg)   12/07/23 210 lb (95.3 kg)       Physical Exam  Constitutional:       General: She is not in acute distress. Appearance: Normal appearance. She is not ill-appearing. HENT:      Head: Normocephalic and atraumatic. Pulmonary:      Effort: Pulmonary effort is normal. No respiratory distress. Neurological:      Mental Status: She is alert and oriented to person, place, and time. Psychiatric:         Mood and Affect: Mood normal.         Behavior: Behavior normal.         Thought Content: Thought content normal.         Judgment: Judgment normal.   Vitals and nursing note reviewed. Assessment/Plan:   2 weeks postpartum, stable. Breastfeeding without difficulty  Contraception: considering POPs as a bridge to vasectomy  Today's Plan: Patient status and history discussed with Dr. Esther Camarillo. At this time will continue with labetelol 200mg BID. Pt instructed to continue monitoring BP at home and call with any Bps 160 or greater systolic or 657 or greater diastolic. Will have pt schedule a virtual check in visit in 1 week. Pt instructed to call right away if she has any feeling of chest pain, heart racing, palpitations, any chest discomfort of any kind. Pt verbalized understanding and agreement with all instructions and plan. Return to clinic: 1 week virtual check in with home BP cuff    Counseling included:   Signs/symptoms of postpartum depression  Postpartum danger signs  Lactation support and troubleshooting  Maternal and family adaption  Sleep/rest strategies  Sexuality  Infant safety and care  Parenting concerns  Occupational concerns  Contraception    Elver Solis verbalized understanding of plan of care. All questions answered. No barriers to learning identified.

## 2024-01-04 ENCOUNTER — TELEMEDICINE (OUTPATIENT)
Dept: OBGYN CLINIC | Facility: CLINIC | Age: 44
End: 2024-01-04
Payer: COMMERCIAL

## 2024-01-04 NOTE — PROGRESS NOTES
This visit is conducted using Telemedicine with live, interactive video and audio.    Patient has been referred to the [facility] website at [email or web address] to review the yearly Consent to Treat document.    Patient understands and accepts financial responsibility for any deductible, co-insurance and/or co-pays associated with this service.    Duration of face-to-face time in visit was 8 minutes.    Subjective: Nany is 3 weeks postpartum after a vaginal delivery of a baby boy.  See L&D delivery summary for birth statistics.  She is without concerns today. Bleeding is decreasing and not experiencing any excessive pain.  She reports Bps have been normal since her last visit last week. Has not had any above 140/90. She has been feeling very well. Has not had any heart racing, chest discomfort or palpitations.     Breastfeeding successfully and reports infant is experiencing adequate weight gain.    She denies any signs/symptoms of postpartum depression and has adequate family support.  Denies any abuse.    Contraceptive plan: considering POPs as a bridge to vasectomy     Review of Systems   Constitutional:  Negative for chills and fever.   Eyes:  Negative for blurred vision.   Respiratory:  Negative for cough, shortness of breath and wheezing.    Cardiovascular:  Negative for chest pain and palpitations.   Gastrointestinal:  Negative for diarrhea, nausea and vomiting.   Genitourinary:  Negative for dysuria and frequency.   Neurological:  Negative for dizziness and headaches.   Psychiatric/Behavioral:  Negative for depression and suicidal ideas.        Objective:   There were no vitals filed for this visit.  Wt Readings from Last 6 Encounters:   12/26/23 196 lb (88.9 kg)   12/19/23 210 lb (95.3 kg)   12/13/23 210 lb (95.3 kg)   12/12/23 210 lb (95.3 kg)   12/07/23 209 lb 3.2 oz (94.9 kg)   12/07/23 210 lb (95.3 kg)       Physical Exam  Constitutional:       General: She is not in acute distress.     Appearance:  Normal appearance. She is not ill-appearing.   HENT:      Head: Normocephalic and atraumatic.   Pulmonary:      Effort: Pulmonary effort is normal. No respiratory distress.   Neurological:      Mental Status: She is alert and oriented to person, place, and time.   Psychiatric:         Mood and Affect: Mood normal.         Behavior: Behavior normal.         Thought Content: Thought content normal.         Judgment: Judgment normal.          Assessment/Plan:   3 weeks postpartum, stable. Breastfeeding without difficulty  Contraception: POPs  Today's Plan: continue labetelol at current dose and checking BP at home. Pt to call and hold labetelol dose if BP less than 110/70 or if she has any Bps above 160/110.  Return to clinic: 3 weeks for 6w PPV    Counseling included:   Signs/symptoms of postpartum depression  Postpartum danger signs  Lactation support and troubleshooting  Maternal and family adaption  Sleep/rest strategies  Sexuality  Infant safety and care  Parenting concerns  Occupational concerns  Contraception    Nany verbalized understanding of plan of care. All questions answered. No barriers to learning identified.

## 2024-01-25 PROBLEM — O34.219 VBAC (VAGINAL BIRTH AFTER CESAREAN) (HCC): Status: ACTIVE | Noted: 2023-12-14

## 2024-01-25 PROBLEM — O34.219 VBAC (VAGINAL BIRTH AFTER CESAREAN): Status: ACTIVE | Noted: 2023-12-14

## 2024-01-26 ENCOUNTER — POSTPARTUM (OUTPATIENT)
Dept: OBGYN CLINIC | Facility: CLINIC | Age: 44
End: 2024-01-26

## 2024-01-26 VITALS — DIASTOLIC BLOOD PRESSURE: 72 MMHG | BODY MASS INDEX: 31 KG/M2 | WEIGHT: 196 LBS | SYSTOLIC BLOOD PRESSURE: 110 MMHG

## 2024-01-26 DIAGNOSIS — O13.9 GESTATIONAL HYPERTENSION, ANTEPARTUM: ICD-10-CM

## 2024-01-26 DIAGNOSIS — N89.8 VAGINAL DISCHARGE: ICD-10-CM

## 2024-01-26 RX ORDER — MELATONIN
1 EVERY OTHER DAY
Qty: 22 TABLET | Refills: 0 | Status: SHIPPED | OUTPATIENT
Start: 2024-01-26

## 2024-01-26 NOTE — PROGRESS NOTES
Chief Complaint   Patient presents with    Postpartum Care        HPI:   Nany is 43 year old , here today for 6-week postpartum visit.  Type and date of Delivery: , Complications: gestational hypertension  See Delivery Summary for baby's gender and weight  Perineum: 2nd degree laceration was repaired, no concerns. Does note a fishy odor tho.  Feeding Method: breast, no concerns  Bonding: well  Maternal sleep: 3-4 hour stretches. Returning to work from home next week  Sexual activity resumed: no. Contraceptive Method: Slynd as bridge to vasectomy  Postpartum Depression screen: denies    HISTORY:  Patient Active Problem List   Diagnosis    Family history of malignant neoplasm of breast    Dense breasts    H/O  section    History of postpartum depression    GERD (gastroesophageal reflux disease)    Gestational hypertension     (vaginal birth after )    Postpartum anemia       Wt Readings from Last 6 Encounters:   24 196 lb (88.9 kg)   23 196 lb (88.9 kg)   23 210 lb (95.3 kg)   23 210 lb (95.3 kg)   23 210 lb (95.3 kg)   23 209 lb 3.2 oz (94.9 kg)       30 lb (13.6 kg)    Medications (Active prior to today's visit):  Current Outpatient Medications   Medication Sig Dispense Refill    Drospirenone 4 MG Oral Tab Take 1 tablet by mouth daily. 28 tablet 11    labetalol 200 MG Oral Tab Take 1 tablet (200 mg total) by mouth 2 (two) times daily. 60 tablet 1    levothyroxine 50 MCG Oral Tab Take 1 tablet (50 mcg total) by mouth before breakfast. 90 tablet 3    Prenatal Vit-Fe Fumarate-FA (SM PRENATAL VITAMINS) 28-0.8 MG Oral Tab Take by mouth.      ferrous sulfate 325 (65 FE) MG Oral Tab EC Take 1 tablet (325 mg total) by mouth every other day. (Patient not taking: Reported on 2024) 22 tablet 0    aspirin 81 MG Oral Chew Tab Chew by mouth daily. (Patient not taking: Reported on 2024)      montelukast 10 MG Oral Tab  (Patient not taking: Reported on  1/26/2024)         Allergies:   No Known Allergies    PHYSICAL EXAM:   Vitals:    01/26/24 1423   BP: 110/72     Physical Exam  Vitals reviewed.   Constitutional:       Appearance: Normal appearance.   HENT:      Head: Normocephalic.   Pulmonary:      Effort: Pulmonary effort is normal.   Abdominal:      Comments: Rectus abdominus muscles 1 finger-breath diastasis  Uterus palpates within the pelvis by abdominal exam     Genitourinary:     Comments: Well-healed  Good tone with Kegel's    Neurological:      Mental Status: She is alert and oriented to person, place, and time.   Psychiatric:         Behavior: Behavior normal.         Thought Content: Thought content normal.         Judgment: Judgment normal.          No results found for this or any previous visit (from the past 24 hour(s)).       ASSESSMENT/PLAN:   Nany was seen today for postpartum care.    Diagnoses and all orders for this visit:    Postpartum care and examination  -     Drospirenone 4 MG Oral Tab; Take 1 tablet by mouth daily.    Postpartum anemia  -     CBC, Platelet; No Differential; Future    Gestational hypertension, antepartum    Vaginal discharge  -     Vaginitis Vaginosis PCR Panel; Future  -     Vaginitis Vaginosis PCR Panel        Regarding gHTN: Will decrease dose to 100mg BID this week and monitor BP's in hopes of tapering off. Discussed increased lifetime risk of hypertension.    Start Taking               Drospirenone 4 MG Oral Tab Take 1 tablet by mouth daily.           Next annual due: 3 months  Follow-up/Return to clinic: PRN    Counseling:   Postpartum teaching including maternal and family adaptation, sleep/rest strategies, lactation and weaning (if applicable), sexuality/contraception, importance of Kegel's and abdominal exercises, continuation of prenatal vitamins, and signs/symptoms of postpartum depression  Patient verbalized understanding, All questions answered. No barriers to learning identified

## 2024-01-27 LAB
BV BACTERIA DNA VAG QL NAA+PROBE: NEGATIVE
C GLABRATA DNA VAG QL NAA+PROBE: NEGATIVE
C KRUSEI DNA VAG QL NAA+PROBE: NEGATIVE
CANDIDA DNA VAG QL NAA+PROBE: NEGATIVE
T VAGINALIS DNA VAG QL NAA+PROBE: NEGATIVE

## 2024-01-29 ENCOUNTER — TELEPHONE (OUTPATIENT)
Dept: OBGYN CLINIC | Facility: CLINIC | Age: 44
End: 2024-01-29

## 2024-01-29 ENCOUNTER — LAB ENCOUNTER (OUTPATIENT)
Dept: LAB | Facility: HOSPITAL | Age: 44
End: 2024-01-29
Attending: ADVANCED PRACTICE MIDWIFE
Payer: COMMERCIAL

## 2024-01-29 DIAGNOSIS — E03.8 HYPOTHYROIDISM DUE TO HASHIMOTO'S THYROIDITIS: ICD-10-CM

## 2024-01-29 DIAGNOSIS — E06.3 HYPOTHYROIDISM DUE TO HASHIMOTO'S THYROIDITIS: ICD-10-CM

## 2024-01-29 LAB
DEPRECATED RDW RBC AUTO: 40 FL (ref 35.1–46.3)
ERYTHROCYTE [DISTWIDTH] IN BLOOD BY AUTOMATED COUNT: 12.3 % (ref 11–15)
HCT VFR BLD AUTO: 35.3 %
HGB BLD-MCNC: 12.7 G/DL
MCH RBC QN AUTO: 32 PG (ref 26–34)
MCHC RBC AUTO-ENTMCNC: 36 G/DL (ref 31–37)
MCV RBC AUTO: 88.9 FL
PLATELET # BLD AUTO: 244 10(3)UL (ref 150–450)
RBC # BLD AUTO: 3.97 X10(6)UL
T4 FREE SERPL-MCNC: 1 NG/DL (ref 0.8–1.7)
TSI SER-ACNC: 1.33 MIU/ML (ref 0.55–4.78)
WBC # BLD AUTO: 7.1 X10(3) UL (ref 4–11)

## 2024-01-29 PROCEDURE — 85027 COMPLETE CBC AUTOMATED: CPT

## 2024-01-29 PROCEDURE — 36415 COLL VENOUS BLD VENIPUNCTURE: CPT

## 2024-01-29 PROCEDURE — 84439 ASSAY OF FREE THYROXINE: CPT

## 2024-01-29 PROCEDURE — 84443 ASSAY THYROID STIM HORMONE: CPT

## 2024-01-29 NOTE — TELEPHONE ENCOUNTER
Per pt was told to call back with updated insurance so lab orders can be entered, insurance updated.

## 2024-01-29 NOTE — TELEPHONE ENCOUNTER
Name and  verified    Patient verbalized she realized orders had been placed. At lab now to have completed.

## 2024-02-14 RX ORDER — LABETALOL 200 MG/1
200 TABLET, FILM COATED ORAL 2 TIMES DAILY
Qty: 60 TABLET | Refills: 0 | OUTPATIENT
Start: 2024-02-14

## 2024-02-14 NOTE — TELEPHONE ENCOUNTER
You need to call pt regarding this rx. When she last saw Jeanie end of January she was supposed to be tapering to 100 mg BID and possibly coming off. Please see if she is still on meds and at what dose/frequency.

## 2024-02-20 NOTE — TELEPHONE ENCOUNTER
Name and  verified    Patient states she was taking half a tab of labetalol as she thought that was TM recommendations. Has not been taking recently, and reports blood pressures have been good.     Patient reports she is at work, and okay to leave voicemail if needed.     Do you want patient to continue with half a pill of labetalol?

## 2024-02-29 NOTE — TELEPHONE ENCOUNTER
Attempted to reach patient by phone to review request for Labetalol refill. No answer. Left brief voicemail. Sent [employer] message recommending a video or in-person visit to review plan.

## 2024-03-06 ENCOUNTER — NURSE ONLY (OUTPATIENT)
Dept: LACTATION | Facility: HOSPITAL | Age: 44
End: 2024-03-06
Attending: ADVANCED PRACTICE MIDWIFE
Payer: COMMERCIAL

## 2024-03-06 PROCEDURE — 99212 OFFICE O/P EST SF 10 MIN: CPT

## 2024-03-06 NOTE — PROGRESS NOTES
LACTATION NOTE - MOTHER      Evaluation Type: Inpatient    Problems identified  Problems identified: Milk supply WNL;Return to work issues  Problems Identified Other: Returned to work at 6 weeks, working from home and  is primary caregiver during work hours. Having difficulty keeping up with feeding demand due to frequent feedings and balancing work requirements.    Maternal history  Maternal history: AMA;PIH;Obesity  Other/comment: Recently taken off blood pressure medications    Breastfeeding goal  Breastfeeding goal: To maintain breast milk feeding per patient goal    Maternal Assessment  Bilateral Breasts: Soft;Symmetrical  Bilateral Nipples: WNL;Everted  Prior breastfeeding experience (comment below): Multip;Successful  Prior BF experience: comment: Hx of tongue restriction with correction with older children (now 14, 11 and 7 years old). supplemented with formula.  Breastfeeding Assistance: Breastfeeding assistance provided with permission    Pain assessment  Location/Comment: denies    Guidelines for use of:  Current use of pump:: Not pumping  Suggested use of pump: Pump each time a supplement is offered;Pump if infant is not latching to breast             Situation:    C/O: Sleepy, frequent feedings    Background:    HX: Exclusively breastfeeding, offers occasional 3 oz bottle of formula. Mom returned to work at 6 weeks, working from home and Dad is primary caregiver. Mom still breastfeeds as often as she can during work hours, but is finding Victor Hugo gets very sleepy at breast and having difficulty keeping up with feedings every 1.5 hours; wakes up a few times overnight as well. Mom is not pumping.     BW: 3820 g / 8#6.8 lbs  Last wt: 5883 g / 12#15.5 lbs on 2/14/24    Today's wt: 6426 g / 14#2.7 lbs    Assessment:    Breastfeeding: Mom independently latched Victor Hugo, who easily attached to breast. Loud frequent swallows heard, began slowing down about 6-7 minutes in then appeared very sleepy at 10  minute yenifer. Switched to other breast, swallows increased and able to do an additional 10 minutes before self-detaching.     Current feeding: Total transfer of milk 118 ml (74 ml Left breast, 44 ml Right breast)    Maternal Pumping: Not assessed    Recommendations:    Breastfeed on demand, keep on breast as long as you continue to hear swallows. Empty first breast prior to offering second breast. Provide stimulating environment to increase suckling activity.   Consider \"topping off\" with 1-2 oz at bedtime or have Dad bottle-feed once overnight in effort to help sleep-train and increase maternal sleep/rest.  Pump for 15 minutes when bottle-feeding in lieu of breastfeeding. May incorporate routine pumping into routine as time allows, recommend pumping in the morning after breastfeeding.  Return to see Lactation as needed.

## 2024-04-10 ENCOUNTER — OFFICE VISIT (OUTPATIENT)
Dept: SURGERY | Facility: CLINIC | Age: 44
End: 2024-04-10
Payer: COMMERCIAL

## 2024-04-10 VITALS
HEART RATE: 79 BPM | BODY MASS INDEX: 31 KG/M2 | WEIGHT: 197.81 LBS | TEMPERATURE: 97 F | SYSTOLIC BLOOD PRESSURE: 130 MMHG | OXYGEN SATURATION: 95 % | DIASTOLIC BLOOD PRESSURE: 79 MMHG | RESPIRATION RATE: 18 BRPM

## 2024-04-10 DIAGNOSIS — Z80.3 FAMILY HISTORY OF MALIGNANT NEOPLASM OF BREAST: Primary | ICD-10-CM

## 2024-04-10 DIAGNOSIS — Z80.3 FAMILY HISTORY OF BREAST CANCER: ICD-10-CM

## 2024-04-10 DIAGNOSIS — Z12.31 ENCOUNTER FOR SCREENING MAMMOGRAM FOR HIGH-RISK PATIENT: ICD-10-CM

## 2024-04-10 PROCEDURE — 99213 OFFICE O/P EST LOW 20 MIN: CPT | Performed by: SURGERY

## 2024-04-11 PROBLEM — Z80.3 FAMILY HISTORY OF BREAST CANCER: Status: ACTIVE | Noted: 2024-04-11

## 2024-04-11 NOTE — PROGRESS NOTES
[facility]    History of Present Illness:   43-year-old woman who returns for follow-up related to her high risk of developing breast cancer secondary to her family history, which is detailed below.  She denies specific breast lumps, nipple discharge, skin changes or other problems. She has no prior history of breast disease or breast biopsy.  She a bilateral mammogram in 2020 that was unremarkable.  Last imaging was MRI in 2022 which showed an area of enhancement in the right breast measuring 0.6 x 0.4 x 0.7 cm. This area underwent biopsy and was found to be benign. She is now a few months postpartum and continues to breast-feed.  She did have any new clinical concerns to her breast.  She is here for recommendations regarding further management.  Past Medical History:   Past Medical History:    Depression    Post partum depression  no medication or counseling    Family history of congenital heart defect    Heart defect in 2nd child that closed without intervention  Per Dr. Kenney: I asked them to find out more about what closed on her son's heart by age 3         RECOMMENDATIONS:  Continue care with  Ms. Fulton  Normal level 2  Follow-up Growth ultrasound with BPP at 32 weeks.  Fetal echocardiogram at 24wks- WNL  Weekly NST's at 32 weeks.  Twice weekly testing at 38 weeks - weekly NST and weekly BPP.    Female infertility    History of chicken pox    History of shingles    History of shoulder dystocia in prior pregnancy    Third pregnancy/. Baby was 9#6oz  No injury to baby (clavicle or brachial plexus)    Oral herpes    Tendinitis    Thyroid disease   Anxiety   Past Surgical History:   Repair of skiers thumb in    Gynecological History:   Pt is a    Pt was 28 years old at time of first pregnancy.   She has a cumulative breastfeeding history of >24 months.   She achieved menarche at age 12 and LMP No LMP recorded (lmp unknown).  She has a history of  oral contraceptive use for 7 years+.   She denies infertility treatment to achieve pregnancy.   Medications:   Current Outpatient Medications on File Prior to Visit   Medication Sig Dispense Refill    aspirin 81 MG Oral Chew Tab Chew by mouth daily.      levothyroxine 50 MCG Oral Tab Take 1 tablet (50 mcg total) by mouth before breakfast. 90 tablet 3    montelukast 10 MG Oral Tab       Prenatal Vit-Fe Fumarate-FA (SM PRENATAL VITAMINS) 28-0.8 MG Oral Tab Take by mouth.      ferrous sulfate 325 (65 FE) MG Oral Tab EC Take 1 tablet (325 mg total) by mouth every other day. (Patient not taking: Reported on 2024) 22 tablet 0    Drospirenone 4 MG Oral Tab Take 1 tablet by mouth daily. (Patient not taking: Reported on 4/10/2024) 28 tablet 11    labetalol 200 MG Oral Tab Take 1 tablet (200 mg total) by mouth 2 (two) times daily. (Patient not taking: Reported on 4/10/2024) 60 tablet 1     No current facility-administered medications on file prior to visit.     Allergies:   No known drug allergies   No latex allergies   Family History:   She is not of Ashkenazi Judaism ancestry   Mother with breast cancer at the age of 38, currently alive, tested NEGATIVE for a BRCA 1/2 mutations in .   Maternal Great Aunt with breast cancer in her 60's,  in her 60's.   Maternal third cousin with breast cancer at 45,  at the age of 50.   Maternal cousin with breast cancer at 60, alive   Paternal grandfather with prostate cancer at the age of 70, currently alive at the age of 82.   Social History:   Alcohol Use: 1-4 drinks per week   Smoking Status: Never   She is  and has 3 children. She is employed full-time.   Review of Systems:   General:   The patient denies, fever, chills, night sweats, fatigue, generalized weakness, change in appetite or weight loss.   HEENT:   The patient denies eye irritation, cataracts, redness, glaucoma, yellowing of the eyes, change in vision or color blindness. The patient denies  hearing loss, ringing in the ears, ear drainage, earaches, nasal congestion, nose bleeds, snoring, pain in mouth/throat, hoarseness, change in voice, facial trauma. +cold sores   Respiratory:   The patient denies chronic cough, phlegm, hemoptysis, pleurisy/chest pain, pneumonia, asthma, wheezing, difficulty in breathing with exertion, emphysema, chronic bronchitis, shortness of breast or abnormal sound when breathing.   Cardiovascular:   There is no history of chest pain, chest pressure/discomfort, palpitations, irregular heartbeat, fainting or near-fainting, difficulty breathing when lying flat, SOB/Coughing at night, swelling of the legs or chest pain while walking.   Breasts: See history of present illness   Gastrointestinal:   There is no history of difficulty or pain with swallowing, vomiting, dark or bloody stools, constipation, yellowing of the skin, indigestion, nausea, change in bowel habits, diarrhea, abdominal pain or vomiting blood. +reflux symptoms   Genitourinary:   The patient denies frequent urination, needing to get up at night to urinate, urinary hesitancy or retaining urine, painful urination, urinary incontinence, decreased urine stream, blood in the urine or vaginal/penile discharge.   Skin:   The patient denies rash, itching, skin lesions, change in skin color or change in moles. +dry skin   Hematologic/Lymphatic:   The patient denies easily bruising or bleeding or persistent swollen glands or lymph nodes.   Musculoskeletal:   The patient denies muscle aches/pain, joint pain, stiff joints, neck pain, back pain or bone pain.   Neuropsychiatric:   There is no history of migraines or severe headaches, seizure/epilepsy, speech problems, coordination problems, trembling/tremors, fainting/black outs, dizziness, memory problems, loss of sensation/numbness, problems walking, weakness, tingling or burning in hands/feet. There is no history of abusive relationship, bipolar disorder, sleep disturbance,  depression or feeling of despair. +anxiety   Endocrine:   There is no history of poor/slow wound healing, weight loss/gain, fertility or hormone problems, cold intolerance, thyroid disease.   Allergic/Immunologic:   There is no history of hives, hay fever, angioedema or anaphylaxis.   Vital Signs    /79 (BP Location: Left arm, Patient Position: Sitting, Cuff Size: adult)   Pulse 79   Temp 97.4 °F (36.3 °C) (Temporal)   Resp 18   Wt 89.7 kg (197 lb 12.8 oz)   LMP  (LMP Unknown)   SpO2 95%   BMI 30.98 kg/m²   Physical Exam:   The patient is an alert, oriented, well-nourished and well-developed woman who appears her stated age. Her speech patterns and movements are normal. Her affect is appropriate.   HEENT: The head is normocephalic. The neck is supple. The thyroid is not enlarged and is without palpable masses/nodules. There are no palpable masses. The trachea is in the midline. Conjunctiva are clear, non-icteric.   Chest: The chest expands symmetrically. The lungs are clear to auscultation.   Heart: The rhythm is regular. There are no murmurs, rubs, gallops or thrills.   Breasts: Her breasts are symmetrical with a cup size DD.   Right breast:: The skin, nipple ,and areola appear normal. There is no skin dimpling with movement of the pectoralis. There is no nipple retraction. No nipple discharge can be elicited. The parenchyma is mildly nodular. There are no dominant masses in the breast. The axillary tail is normal.   Left breast: The skin, nipple, and areola appear normal. There is no skin dimpling with movement of the pectoralis. There is no nipple retraction. No nipple discharge can be elicited. The parenchyma is mildly nodular. There are no dominant masses in the breast.   Abdomen: The abdomen is soft, flat and non tender. The liver is not enlarged. There are no palpable masses.   Lymph Nodes: The supraclavicular, axillary and cervical regions are free of significant lymphadenopathy.   Back: There  is no vertebral column tenderness.   Skin: The skin appears normal. There are no suspicious appearing rashes or lesions.   Extremities: The extremities are without deformity, cyanosis or edema.   Risk Estimate:   Emma: Five Year is: N/A as patient is <34 y/o   Emma Lifetime is: N/A as patient is <34 y/o   BRCA Pro Lifetime is: 12.1%   Tyrer-Cuzick Ten Year is: 1.6, compared to 0.8% for an average risk peer.   Tyrer-Cuzick Lifetime is: 27%, compared to 13.3% for an average risk peer.   Probability of BRCA 1 or 2 alteration is: 0.5% using BRCA PRO, and 0.07% using Tyrer-Cuzick considering the fact that her mother tested negative for a BRCA mutation.   Assessment:   44 y/o F with an increased risk for breast cancer based on multiple risk assessment models as well as a Left Breast sebaceous cyst.   Discussion and Plan:   I explained her breast cancer risk estimates to her and answered questions she had pertaining to her level of risk. The patient's lifetime risk for breast cancer is elevated when compared to her peer group. We discussed factors that would further increase her risk for breast cancer over time to include age, future breast biopsy, as well as additional family members that may be diagnosed with breast cancer.   We then turned our discussion towards genetic counseling and testing as her likelihood for carrying a mutation is low. Based on the findings, I do not recommend BRCA testing. We discussed the implications of carrying a genetic mutation as well as both surveillance programs and surgical risk-reducing surgery. The patient was informed that given that her mother has tested negative for a BRCA mutation and the fact that she does not have a paternal family history of breast cancer, the likelihood of her possessing one is negligible.  We then talked about surveillance and I reassured her that her normal clinical exam today demonstrates no suspicious findings.  I gave her an order for her annual mammogram.   Provided those results are within normal limit we will discuss triage and timing of the annual breast MRI at that time.    Recommended semiannual breast exams as well as annual mammography. We reviewed the NCCN guidelines which recommend that mammography screening begin 10 years earlier that the age of earliest breast cancer diagnosis in the family, which was at the age of 39 y/o in her mother. At this point, I would also continue Breast MRI, given her lifetime risk of breast cancer is 27% and the ACS recommends considering this as an adjunct modality in this population. I recommend annual MRIs to be staggered six months in relation with her annual mammogram. The indications, risks and benefits of MRI evaluation were discussed, including the greater sensitivity of MRI at the cost of a high false positive rate, leading to additional imaging procedures and possible unnecessary biopsy. In light of her consideration of additional pregnancies, with clarification and mammogram now in determining the role of MRI surveillance pending her family planning.  With regard to risk-reducing interventions, we discussed lifestyle modifications including attention to diet, exercise, weight control, moderation in alcohol use, and avoidance of long-term hormone replacement therapy in the future. We also discussed the benefit of a cumulative time of eighteen months or more of breastfeeding. She has been encouraged to contact the office with any questions/concerns prior to her next appointment.   I discussed Mrs. Nagel's breast cancer risk evaluation with her during a 25 minute encounter, more than 50% of which was devoted to the discussion of management options.

## 2024-07-05 ENCOUNTER — TELEPHONE (OUTPATIENT)
Dept: ENDOCRINOLOGY CLINIC | Facility: CLINIC | Age: 44
End: 2024-07-05

## 2024-07-05 ENCOUNTER — LAB ENCOUNTER (OUTPATIENT)
Dept: LAB | Facility: HOSPITAL | Age: 44
End: 2024-07-05
Attending: INTERNAL MEDICINE
Payer: COMMERCIAL

## 2024-07-05 DIAGNOSIS — E06.3 HYPOTHYROIDISM DUE TO HASHIMOTO'S THYROIDITIS: ICD-10-CM

## 2024-07-05 LAB
T4 FREE SERPL-MCNC: 1.2 NG/DL (ref 0.8–1.7)
TSI SER-ACNC: 1.66 MIU/ML (ref 0.55–4.78)

## 2024-07-05 PROCEDURE — 36415 COLL VENOUS BLD VENIPUNCTURE: CPT

## 2024-07-05 PROCEDURE — 84439 ASSAY OF FREE THYROXINE: CPT

## 2024-07-05 PROCEDURE — 84443 ASSAY THYROID STIM HORMONE: CPT

## 2024-07-05 NOTE — TELEPHONE ENCOUNTER
Patient calling regards refill request, declined to schedule next available until November. States insurance will be terminating by end of July. Please call.     levothyroxine 50 MCG Oral Tab

## 2024-07-08 RX ORDER — LEVOTHYROXINE SODIUM 0.05 MG/1
50 TABLET ORAL
Qty: 90 TABLET | Refills: 0 | Status: SHIPPED | OUTPATIENT
Start: 2024-07-08

## 2024-07-08 NOTE — TELEPHONE ENCOUNTER
Ok to send 90 day script.  Are there any cancellation spots? Otherwise will she be getting new insurance to schedule in the fall? Thanks.

## 2024-07-08 NOTE — TELEPHONE ENCOUNTER
Levothyroxine 50 mcg daily 90 day supply sent to [facility].     LVM for patient to call to schedule follow up

## 2024-07-08 NOTE — TELEPHONE ENCOUNTER
Patient returned call just wanted to thank Dr. Son for sending prescription and is going to wait on scheduling appointment until new insurance please note

## 2024-07-08 NOTE — TELEPHONE ENCOUNTER
Dr. Son,  See message below  - LOV was 6/21/23 with RTC 1 year    Component      Latest Ref Rng 7/5/2024   TSH      0.550 - 4.780 mIU/mL 1.657      Component      Latest Ref Rng 7/5/2024   T4,Free (Direct)      0.8 - 1.7 ng/dL 1.2      Please advise on refill request and apt -thanks

## 2024-10-03 DIAGNOSIS — E06.3 HYPOTHYROIDISM DUE TO HASHIMOTO'S THYROIDITIS: ICD-10-CM

## 2024-10-03 RX ORDER — LEVOTHYROXINE SODIUM 50 UG/1
50 TABLET ORAL
Qty: 30 TABLET | Refills: 0 | Status: SHIPPED | OUTPATIENT
Start: 2024-10-03

## 2024-10-03 NOTE — TELEPHONE ENCOUNTER
Endocrine refill protocol for medications for hypothyroidism and hyperthyroidism    Protocol Criteria:  FAILED Reason: No Visit in required time frame. MyChart sent. 30 day Rx pended.    If all below requirements are met, send a 90-day supply with 1 refill per provider protocol.    Verify appointment with Endocrinology completed in the last 12 months or scheduled in the next 6 months.    Normal TSH result in the past 12 months   Review recent telephone encounters and [employer] communications with patient to ensure a dose change has not occurred since last office visit that was not updated in the medication history list     Last completed office visit: 6/21/23  Next scheduled Follow up: No future appointments.   Last TSH result:   TSH   Date Value Ref Range Status   07/05/2024 1.657 0.550 - 4.780 mIU/mL Final     TSH (S)   Date Value Ref Range Status   04/09/2016 0.99 0.34 - 5.60 uIU/mL Final

## 2024-11-05 DIAGNOSIS — E06.3 HYPOTHYROIDISM DUE TO HASHIMOTO'S THYROIDITIS: ICD-10-CM

## 2024-11-05 RX ORDER — LEVOTHYROXINE SODIUM 50 UG/1
50 TABLET ORAL
Qty: 30 TABLET | Refills: 0 | Status: SHIPPED | OUTPATIENT
Start: 2024-11-05

## 2024-11-05 NOTE — TELEPHONE ENCOUNTER
Endocrine refill protocol for medications for hypothyroidism and hyperthyroidism    Protocol Criteria:  PASSED Reason: N/A    If all below requirements are met, send a 90-day supply with 1 refill per provider protocol.    Verify appointment with Endocrinology completed in the last 12 months or scheduled in the next 6 months.    Normal TSH result in the past 12 months   Review recent telephone encounters and mychart communications with patient to ensure a dose change has not occurred since last office visit that was not updated in the medication history list     Last completed office visit: 6/21/23  Next scheduled Follow up:   Future Appointments   Date Time Provider Department Center   2/20/2025  4:15 PM Dolores Son MD [facility]      Last TSH result:   TSH   Date Value Ref Range Status   07/05/2024 1.657 0.550 - 4.780 mIU/mL Final     TSH (S)   Date Value Ref Range Status   04/09/2016 0.99 0.34 - 5.60 uIU/mL Final

## 2024-12-01 DIAGNOSIS — E06.3 HYPOTHYROIDISM DUE TO HASHIMOTO'S THYROIDITIS: ICD-10-CM

## 2024-12-02 RX ORDER — LEVOTHYROXINE SODIUM 50 UG/1
50 TABLET ORAL
Qty: 30 TABLET | Refills: 1 | Status: SHIPPED | OUTPATIENT
Start: 2024-12-02

## 2024-12-02 NOTE — TELEPHONE ENCOUNTER
Endocrine refill protocol for medications for hypothyroidism and hyperthyroidism    Protocol Criteria:  PASSED Reason: N/A    If all below requirements are met, send a 90-day supply with 1 refill per provider protocol.    Verify appointment with Endocrinology completed in the last 12 months or scheduled in the next 6 months.    Normal TSH result in the past 12 months   Review recent telephone encounters and mychart communications with patient to ensure a dose change has not occurred since last office visit that was not updated in the medication history list     Last completed office visit:6/21/23  Next scheduled Follow up:   Future Appointments   Date Time Provider Department Center   2/20/2025  4:15 PM Dolores Son MD [facility]      Last TSH result:   TSH   Date Value Ref Range Status   07/05/2024 1.657 0.550 - 4.780 mIU/mL Final     TSH (S)   Date Value Ref Range Status   04/09/2016 0.99 0.34 - 5.60 uIU/mL Final

## 2025-01-28 DIAGNOSIS — E06.3 HYPOTHYROIDISM DUE TO HASHIMOTO'S THYROIDITIS: ICD-10-CM

## 2025-01-28 RX ORDER — LEVOTHYROXINE SODIUM 50 UG/1
50 TABLET ORAL
Qty: 30 TABLET | Refills: 0 | Status: SHIPPED | OUTPATIENT
Start: 2025-01-28

## 2025-01-28 NOTE — TELEPHONE ENCOUNTER
Endocrine refill protocol for medications for hypothyroidism and hyperthyroidism    Protocol Criteria:  PASSED Reason: N/A    If all below requirements are met, send a 90-day supply with 1 refill per provider protocol.    Verify appointment with Endocrinology completed in the last 12 months or scheduled in the next 6 months.    Normal TSH result in the past 12 months   Review recent telephone encounters and mychart communications with patient to ensure a dose change has not occurred since last office visit that was not updated in the medication history list     Last completed office visit:Visit date not found   Next scheduled Follow up:   Future Appointments   Date Time Provider Department Center   2/20/2025  4:15 PM Dolores Son MD [facility]      Last TSH result:   TSH   Date Value Ref Range Status   07/05/2024 1.657 0.550 - 4.780 mIU/mL Final     TSH (S)   Date Value Ref Range Status   04/09/2016 0.99 0.34 - 5.60 uIU/mL Final

## 2025-02-10 ENCOUNTER — TELEPHONE (OUTPATIENT)
Dept: ENDOCRINOLOGY CLINIC | Facility: CLINIC | Age: 45
End: 2025-02-10

## 2025-02-10 DIAGNOSIS — E06.3 HYPOTHYROIDISM DUE TO HASHIMOTO'S THYROIDITIS: Primary | ICD-10-CM

## 2025-02-10 NOTE — TELEPHONE ENCOUNTER
Patient is scheduled to follow up on 2/13/25 and is requesting lab orders to be placed.  Patient is planning on having labs drawn tomorrow morning.  Please call

## 2025-02-11 ENCOUNTER — LAB ENCOUNTER (OUTPATIENT)
Dept: LAB | Facility: HOSPITAL | Age: 45
End: 2025-02-11
Attending: INTERNAL MEDICINE
Payer: COMMERCIAL

## 2025-02-11 DIAGNOSIS — E06.3 HYPOTHYROIDISM DUE TO HASHIMOTO'S THYROIDITIS: ICD-10-CM

## 2025-02-11 LAB
T4 FREE SERPL-MCNC: 1.3 NG/DL (ref 0.8–1.7)
TSI SER-ACNC: 1.68 UIU/ML (ref 0.55–4.78)

## 2025-02-11 PROCEDURE — 84439 ASSAY OF FREE THYROXINE: CPT

## 2025-02-11 PROCEDURE — 36415 COLL VENOUS BLD VENIPUNCTURE: CPT

## 2025-02-11 PROCEDURE — 84443 ASSAY THYROID STIM HORMONE: CPT

## 2025-02-11 NOTE — TELEPHONE ENCOUNTER
Tommy from central scheduling states patient is currently there to get orders attempting Rn now. Please call.

## 2025-02-13 ENCOUNTER — OFFICE VISIT (OUTPATIENT)
Dept: ENDOCRINOLOGY CLINIC | Facility: CLINIC | Age: 45
End: 2025-02-13

## 2025-02-13 VITALS
BODY MASS INDEX: 32 KG/M2 | DIASTOLIC BLOOD PRESSURE: 81 MMHG | WEIGHT: 202 LBS | HEART RATE: 75 BPM | SYSTOLIC BLOOD PRESSURE: 128 MMHG

## 2025-02-13 DIAGNOSIS — E03.8 SUBCLINICAL HYPOTHYROIDISM: Primary | ICD-10-CM

## 2025-02-13 PROCEDURE — 99213 OFFICE O/P EST LOW 20 MIN: CPT | Performed by: INTERNAL MEDICINE

## 2025-02-13 RX ORDER — LEVOTHYROXINE SODIUM 50 UG/1
50 TABLET ORAL
Qty: 90 TABLET | Refills: 3 | Status: SHIPPED | OUTPATIENT
Start: 2025-02-13

## 2025-02-13 NOTE — PROGRESS NOTES
Name: Nany Nagel  Date: 2/13/2025    Referring Physician: No ref. provider found    Chief Complaint   Patient presents with    Hypothyroidism       HISTORY OF PRESENT ILLNESS   Nany Nagel is a 44 year old female who presents for   Chief Complaint   Patient presents with    Hypothyroidism     45 y/o F presents for follow up evaluation of thyroid function.  She had normal TFTs approximately 5 years ago during her first pregnancy.  However she started fertility therapy in the past 2 years leading to abnormal TFTs. She was started on Levothyroxine 50mcg PO daily, taking medication in AM and waiting 30-60 min before eating.     She has been maintained on Levothyroxine 50mcg PO daily, taking in AM and waiting 30-60 min before eating.  She was started on thyroid hormone during pregnancy so unclear if necessary long term.      She is still currently breast feeding 13 month old son.  Weight stable.  Energy level stable.     Maternal h/o hypothyroidism on levothyroxine.     REVIEW OF SYSTEMS  Eyes: no change in vision  Neurologic: no headache, generalized or focal weakness or numbness.  Head: normal  ENT: normal  Lungs: no shortness of breath, wheezing or BRANDON  Cardiovascular:  no chest pain or palpitations  Gastrointestinal:  no abdominal pain, bowel movement problems  Musculoskeletal: no muscle pain or arthralgia  /Gyne: no frequency or discomfort while urinating  Psychiatric:  no acute distress, anxiety  or depression  Skin: normal moisturized skin    Medications:     Current Outpatient Medications:     LEVOTHYROXINE 50 MCG Oral Tab, Take 1 tablet by mouth before breakfast., Disp: 30 tablet, Rfl: 0    montelukast 10 MG Oral Tab, , Disp: , Rfl:     Prenatal Vit-Fe Fumarate-FA (SM PRENATAL VITAMINS) 28-0.8 MG Oral Tab, Take by mouth., Disp: , Rfl:     ferrous sulfate 325 (65 FE) MG Oral Tab EC, Take 1 tablet (325 mg total) by mouth every other day. (Patient not taking: Reported on 1/26/2024), Disp:  22 tablet, Rfl: 0    Drospirenone 4 MG Oral Tab, Take 1 tablet by mouth daily. (Patient not taking: Reported on 4/10/2024), Disp: 28 tablet, Rfl: 11    labetalol 200 MG Oral Tab, Take 1 tablet (200 mg total) by mouth 2 (two) times daily. (Patient not taking: Reported on 4/10/2024), Disp: 60 tablet, Rfl: 1    aspirin 81 MG Oral Chew Tab, Chew by mouth daily., Disp: , Rfl:      Allergies:   No Known Allergies    Social History:   Social History     Socioeconomic History    Marital status:      Spouse name: Bruno Nagel    Number of children: 2    Years of education: 18   Occupational History    Occupation: Sales     Comment: Full Time   Tobacco Use    Smoking status: Never    Smokeless tobacco: Never   Vaping Use    Vaping status: Never Used   Substance and Sexual Activity    Alcohol use: Not Currently     Alcohol/week: 3.0 standard drinks of alcohol     Types: 3 Glasses of wine per week    Drug use: No    Sexual activity: Yes     Partners: Male   Other Topics Concern    Blood Transfusions No    Caffeine Concern No    Occupational Exposure No    Weight Concern No    Special Diet No    Exercise Yes     Comment: works 1-3 x's weekly light weights and [employer]    Bike Helmet Yes    Seat Belt Yes       Medical History:   Past Medical History:    Depression    Post partum depression  no medication or counseling    Family history of congenital heart defect    Heart defect in 2nd child that closed without intervention  Per Dr. Kenney: I asked them to find out more about what closed on her son's heart by age 3         RECOMMENDATIONS:  Continue care with  Ms. Fulton  Normal level 2  Follow-up Growth ultrasound with BPP at 32 weeks.  Fetal echocardiogram at 24wks- WNL  Weekly NST's at 32 weeks.  Twice weekly testing at 38 weeks - weekly NST and weekly BPP.    Female infertility    History of chicken pox    History of shingles    History of shoulder dystocia in prior pregnancy    Third pregnancy/. Baby was 9#6oz  No  injury to baby (clavicle or brachial plexus)    Oral herpes    Tendinitis    Thyroid disease       Surgical history:   Past Surgical History:   Procedure Laterality Date          Hand/finger surgery unlisted Left        PHYSICAL EXAMINATION:  /81   Pulse 75   Wt 202 lb (91.6 kg)   BMI 31.64 kg/m²     General Appearance:  Alert, in no acute distress, well developed  Eyes: normal conjunctivae, sclera.  Ears/Nose/Mouth/Throat/Neck:  normal hearing, normal speech and irregular thyroid gland   Musculoskeletal:  normal muscle strength and tone  PV: normal pulses of carotids, pedals  Skin:  normal moisture and skin texture  Hair & Nails:  normal scalp hair     Neuro:  sensory grossly intact and motor grossly intact  Psychiatric:  oriented to time, self, and place  Nutritional:  no abnormal weight gain or loss    ASSESSMENT/PLAN:    1. Subclinical Hypothyroidism   - Discussed diagnosis with patient  - Continue Levothryoxine 50mcg PO daily   - She is taking in AM and waiting 30-60 min before eating   - Normal TFTs   - Check Thyroid US given enlargement on exam   - BP improved on repeat     RTC 1 year     2025  Dolores Son MD

## 2025-02-18 ENCOUNTER — HOSPITAL ENCOUNTER (OUTPATIENT)
Dept: ULTRASOUND IMAGING | Age: 45
Discharge: HOME | End: 2025-02-18
Attending: INTERNAL MEDICINE
Payer: COMMERCIAL

## 2025-02-18 DIAGNOSIS — E03.8 SUBCLINICAL HYPOTHYROIDISM: ICD-10-CM

## 2025-02-18 PROCEDURE — 76536 US EXAM OF HEAD AND NECK: CPT | Performed by: INTERNAL MEDICINE

## 2025-02-21 ENCOUNTER — TELEPHONE (OUTPATIENT)
Dept: ENDOCRINOLOGY CLINIC | Facility: CLINIC | Age: 45
End: 2025-02-21

## 2025-02-21 DIAGNOSIS — E04.1 THYROID NODULE: Primary | ICD-10-CM

## 2025-02-21 NOTE — TELEPHONE ENCOUNTER
Patient calling regards obtaining order for test results. Clarified with patient did not maddi clear, states needs biopsy and asking to speak to RN. Please call.

## 2025-02-21 NOTE — TELEPHONE ENCOUNTER
Spoke to pt. Provided results and recommendations written below by MD. She verbalized understanding. Pt states she does have central scheduling number. Denies further questions or concerns.

## 2025-02-21 NOTE — TELEPHONE ENCOUNTER
Please call patient - she does have large nodule in her left lobe of the thyroid.  The risk of thyroid cancer in nodules is low approximately 3-5% but I do recommend we proceed with FNA.  I placed the order.

## 2025-02-25 ENCOUNTER — HOSPITAL ENCOUNTER (OUTPATIENT)
Dept: MAMMOGRAPHY | Facility: HOSPITAL | Age: 45
Discharge: HOME | End: 2025-02-25
Attending: SURGERY
Payer: COMMERCIAL

## 2025-02-25 DIAGNOSIS — Z80.3 FAMILY HISTORY OF BREAST CANCER: ICD-10-CM

## 2025-02-25 DIAGNOSIS — Z12.31 ENCOUNTER FOR SCREENING MAMMOGRAM FOR HIGH-RISK PATIENT: ICD-10-CM

## 2025-02-25 PROCEDURE — 77067 SCR MAMMO BI INCL CAD: CPT | Performed by: SURGERY

## 2025-02-25 PROCEDURE — 77063 BREAST TOMOSYNTHESIS BI: CPT | Performed by: SURGERY

## 2025-03-03 ENCOUNTER — TELEPHONE (OUTPATIENT)
Dept: SURGERY | Facility: CLINIC | Age: 45
End: 2025-03-03

## 2025-03-03 NOTE — TELEPHONE ENCOUNTER
Returning patient's phone call. Instructed patient that Dr. Go is recommending further diagnostic breast imaging based off of mammogram report. Patient unsure that she wants to complete this at this time, she may wait until she completes breast feeding. Explained that is ultimately her choice, but we do recommend she proceed with additional mammographic imaging now.

## 2025-03-10 NOTE — DISCHARGE INSTRUCTIONS
Discharge/After Visit [facility] - Department of Radiology  Biopsy of the Thyroid - Biopsy of Lymph Node - Biopsy of Soft Tissue    Activity  Take it easy for the rest of the day after your biopsy. You may be sore at the site of the biopsy for the next 5 days. Do not do any strenuous exercises or lift over 5 lbs. for 24 hours after the procedure.     Biopsy Site  Keep the bandage on the biopsy site for the next hour. No shower/bathing restrictions.    Pain Management  You may use over-the-counter or prescribed pain relief medication if not contraindicated due to a medical condition.   You may use a covered ice pack to the procedure site for 20 min, as needed, for pain.   The site may be red or tender for a few days.  You may develop a sore throat after the procedure. If necessary, throat lozenges may be used to relieve the discomfort.     Medications  You may resume your usual home medications, including blood thinners (24 hours after the procedure) if you experience no bleeding or hematoma at the puncture site.     When to seek medical advice  Call the provider that ordered the biopsy with questions and to discuss test results. They may also be available on your [facility] My Chart. You may also call the Radiology nurse at [phone], M-F, 8-5 with any additional questions or concerns.    Dial [phone] and ask the  to page the on-call Radiologist right away if any of these occur:  There is a change in color or temperature of the area where the biopsy was performed.  You develop increasing pain, increased swelling in your neck, difficulty breathing or swallowing.    IF YOU FEEL YOU ARE HAVING AN EMERGENCY,   CALL 911 OR GO TO THE NEAREST EMERGENCY ROOM      4.2.24 MO/  Radiology

## 2025-03-11 ENCOUNTER — HOSPITAL ENCOUNTER (OUTPATIENT)
Dept: ULTRASOUND IMAGING | Facility: HOSPITAL | Age: 45
Discharge: HOME | End: 2025-03-11
Attending: INTERNAL MEDICINE
Payer: COMMERCIAL

## 2025-03-11 DIAGNOSIS — E04.1 THYROID NODULE: ICD-10-CM

## 2025-03-11 PROCEDURE — 88173 CYTOPATH EVAL FNA REPORT: CPT | Performed by: INTERNAL MEDICINE

## 2025-03-11 PROCEDURE — 10005 FNA BX W/US GDN 1ST LES: CPT | Performed by: INTERNAL MEDICINE

## 2025-03-17 ENCOUNTER — TELEPHONE (OUTPATIENT)
Dept: ENDOCRINOLOGY CLINIC | Facility: CLINIC | Age: 45
End: 2025-03-17

## 2025-03-17 NOTE — TELEPHONE ENCOUNTER
Patient is calling back.  She spoke with insurance and was informed testing can be covered but will required a Prior Authorization.

## 2025-03-17 NOTE — TELEPHONE ENCOUNTER
Doctor Adelaida,  Pathology will send us paperwork for Afirma testing. They provided us for procedure code. However [employer] will complete the Prior Authorization.    They provided us with a procedure code (CPT): 37111.  [facility] (Rendering laboratory) NPI: [id number].    --    [employer] does Prior Authorization including for [employer] insurance. They need to send/fax order and clinic Office Visit note, lab results, and anything else that can support the order request.    Does not know how long it takes for a Prior Authorization result.

## 2025-03-17 NOTE — TELEPHONE ENCOUNTER
I think you are calling the wrong place.  You need to call pathology at [facility] and let them know to send the sample for molecular testing or ask what order to place.

## 2025-03-17 NOTE — TELEPHONE ENCOUNTER
Doctor Son,  Per lab, an order needs to be placed to attempt to look up any procedure codes for the test.    Please place the order and we can call the lab to try and verify next steps regarding this test/insurance.

## 2025-03-17 NOTE — TELEPHONE ENCOUNTER
Left message for patient to call back and discuss biopsy results.  Please transfer call. Thanks.        Stroke code called at 14:21.  called the code

## 2025-03-19 NOTE — TELEPHONE ENCOUNTER
Paper has yet not arrived.  Spoke with Pathology again (Extension [phone]) and requested for fax to be sent to us.  Awaiting.

## 2025-03-19 NOTE — TELEPHONE ENCOUNTER
Checked the office, fax has not yet been received.  Spoke with [facility]. They will send the paper over to the Triage2 fax and endocrinology main.  Awaiting fax.

## 2025-03-19 NOTE — TELEPHONE ENCOUNTER
Miya Schmidt arrived through fax.  Placed in Doctor Adelaida's folder for signature.  Once signed return fax to [phone] along with clinical documentation attached to form.

## 2025-03-19 NOTE — TELEPHONE ENCOUNTER
Pathology called back.  Person responsible for send to us got caught up with procedures urgent requests will send in 15 min

## 2025-03-20 ENCOUNTER — MED REC SCAN ONLY (OUTPATIENT)
Dept: ENDOCRINOLOGY CLINIC | Facility: CLINIC | Age: 45
End: 2025-03-20

## 2025-03-20 NOTE — TELEPHONE ENCOUNTER
Signed [employer] form faxed to [facility] (fax # [phone])    Placed in brown triage folder

## 2025-03-21 NOTE — TELEPHONE ENCOUNTER
Spoke to Jemma at [facility] -she confirmed they only need signed Affirma order form - they will submit form and any other necessary docs to [facility] - nothing needed from our clinic at this time

## 2025-04-03 ENCOUNTER — TELEPHONE (OUTPATIENT)
Dept: ENDOCRINOLOGY CLINIC | Facility: CLINIC | Age: 45
End: 2025-04-03

## 2025-04-03 NOTE — TELEPHONE ENCOUNTER
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  We will plan to follow the nodule with repeat US in one year.  Thanks.

## 2025-05-23 ENCOUNTER — HOSPITAL ENCOUNTER (OUTPATIENT)
Dept: MAMMOGRAPHY | Facility: HOSPITAL | Age: 45
Discharge: HOME | End: 2025-05-23
Attending: SURGERY
Payer: COMMERCIAL

## 2025-05-23 DIAGNOSIS — R92.2 INCONCLUSIVE MAMMOGRAM: ICD-10-CM

## 2025-05-23 PROCEDURE — 77066 DX MAMMO INCL CAD BI: CPT | Performed by: SURGERY

## 2025-05-23 PROCEDURE — 77062 BREAST TOMOSYNTHESIS BI: CPT | Performed by: SURGERY

## 2025-05-23 PROCEDURE — 76642 ULTRASOUND BREAST LIMITED: CPT | Performed by: SURGERY

## 2025-05-23 NOTE — IMAGING NOTE
This Breast Care RN assisted Dr. Castro with recommendation for a right breast 1 site stereotactic biopsy for calcifications.  Procedure reviewed and all questions answered. Emotional and educational support given. On the day of the biopsy, pt instructed to take Tylenol 1000mg PO, eat a light meal & bring or wear a sports bra.  Post biopsy care also reviewed with pt to include NO lifting more than 5lbs, no exercising or housework (limit upper body movement) for 24-48 hrs post biopsy. Patient denies blood thinners, bleeding disorders, liver disease, and chemo. Pt verbalized understanding.  Our breast center schedulers will be calling to schedule an appt that is convenient for pt.

## 2025-05-24 DIAGNOSIS — R92.1 CALCIFICATION OF RIGHT BREAST: Primary | ICD-10-CM

## 2025-05-29 ENCOUNTER — HOSPITAL ENCOUNTER (OUTPATIENT)
Dept: MAMMOGRAPHY | Facility: HOSPITAL | Age: 45
Discharge: HOME | End: 2025-05-29
Attending: SURGERY
Payer: COMMERCIAL

## 2025-05-29 DIAGNOSIS — R92.1 CALCIFICATION OF RIGHT BREAST: ICD-10-CM

## 2025-05-29 PROCEDURE — 88305 TISSUE EXAM BY PATHOLOGIST: CPT | Performed by: SURGERY

## 2025-05-29 PROCEDURE — 19081 BX BREAST 1ST LESION STRTCTC: CPT | Performed by: SURGERY

## 2025-05-29 NOTE — DISCHARGE INSTRUCTIONS
Discharge Instructions  Stereotactic / Ultrasound / MRI Core Biopsy     Place an ice pack on top of the biopsy site in your bra OR the folds of the Ace wrap for 10-15 minutes every hour until bedtime for your comfort and to decrease bleeding.     Keep your supportive bra OR the Ace wrap in place for 24 hours after your biopsy. This compression decreases bleeding and breast movement for your comfort. Wear a supportive bra for the next couple days for comfort (as well as for sleeping)     No bath or shower during the first 24 hours after biopsy. After this time, you may take a bath or shower. It’s okay if the steri-strips get wet but don’t soak them.   No saunas, hot tubs, or swimming pools until the steri-strips fall off (5-7days).  This prevents infection and allows time for the area to completely close and heal.     DO NOT remove the steri-strips. They will fall off in 5 days to two weeks. If any type of irritation (redness, itching, OR blisters) develops in the area around the steri-strips, remove them gently. Keep the area clean and dry.     It is normal to have mild discomfort and bruising at the biopsy site.      You may take Tylenol as needed for discomfort.     DO NOT participate in strenuous activity (aerobics, heavy lifting, housework, gardening, etc.) 48 hours after your biopsy to prevent bleeding.     You will receive results typically within 2-3 business days. Occasionally, the specimen is sent off-site for a 2nd opinion. You will be notified when this occurs as this will delay your results.     If you have any questions about the procedure or your results, please contact the Breast Care Coordinator Nurse at [phone]. (M-F 7:30-4)    If you are having a MRI Breast Biopsy or an Ultrasound guided biopsy, you will be billed for the biopsy and a unilateral mammogram separately.    A 6-month or one year follow-up Diagnostic Mammogram/Ultrasound will be recommended to document stability of the biopsy  site. It may be scheduled at [facility] or [facility] by calling [phone]. You will need an order for this exam from your referring physician.       5/2024

## 2025-06-09 ENCOUNTER — TELEPHONE (OUTPATIENT)
Dept: SURGERY | Facility: CLINIC | Age: 45
End: 2025-06-09

## 2025-06-09 DIAGNOSIS — R92.8 ABNORMAL MAMMOGRAM OF RIGHT BREAST: Primary | ICD-10-CM

## 2025-06-09 NOTE — TELEPHONE ENCOUNTER
Called patient to discuss biopsy results.  Patient verbalized understanding.  Plan for right diagnostic mammogram in December 2025.

## 2025-08-04 ENCOUNTER — MED REC SCAN ONLY (OUTPATIENT)
Dept: FAMILY MEDICINE CLINIC | Facility: CLINIC | Age: 45
End: 2025-08-04

## (undated) DIAGNOSIS — R92.8 ABNORMAL MRI, BREAST: Primary | ICD-10-CM

## (undated) DIAGNOSIS — Z80.3 FAMILY HISTORY OF BREAST CANCER IN FIRST DEGREE RELATIVE: Primary | ICD-10-CM

## (undated) DIAGNOSIS — Z80.3 FAMILY HISTORY OF MALIGNANT NEOPLASM OF BREAST: ICD-10-CM

## (undated) DIAGNOSIS — N60.19 FIBROCYSTIC BREAST DISEASE (FCBD), UNSPECIFIED LATERALITY: ICD-10-CM

## (undated) DIAGNOSIS — R92.2 DENSE BREASTS: ICD-10-CM

## (undated) NOTE — MR AVS SNAPSHOT
[place]  [address]  [phone]               Thank you for choosing us for your health care visit with Roberta Domingo CNM.   We are glad to serve you and happy to provide you with this s office, you can view your past visit information in [facility] by going to Visits < Visit Summaries. [facility] questions? Call [phone] for help. [facility] is NOT to be used for urgent needs. For medical emergencies, dial 911.            Visit [facility]

## (undated) NOTE — ED AVS SNAPSHOT
Ember Cannon   MRN: [id number]    Department:  [facility] Emergency Department   Date of Visit:  11/28/2017           Disclosure     Insurance plans vary and the physician(s) referred by the ER may not be covered by your plan.  Please cont CARE PHYSICIAN AT ONCE OR RETURN IMMEDIATELY TO THE EMERGENCY DEPARTMENT. If you have been prescribed any medication(s), please fill your prescription right away and begin taking the medication(s) as directed.   If you believe that any of the medications

## (undated) NOTE — LETTER
[address] [address]  [phone]    Blood Transfusion Consent    In the course of your treatment, it may become necessary to administer a transfusion of blood or blood components. This form provides basic information concerning this procedure and, if signed by you, authorizes its administration. By signing this form, you agree that all of your questions about the administration of blood or blood products have been answered by the ordering medical professional or designee. Description of Procedure  Blood is introduced into one of your veins, commonly in the arm, using a sterilized disposable needle. The amount of blood transfused, and whether the transfusion will be of blood or blood components is a judgement the physician will make based on your particular needs. Risks  The transfusion is a common procedure of low risk. MINOR AND TEMPORARY REACTIONS ARE NOT UNCOMMON, including a slight bruise, swelling or local reaction in the area where the needle pierces your skin, or a nonserious reaction to the transfused material itself, including headache, fever or mild skin reaction, such as rash. Serious reactions are possible, though very unlikely, and include severe allergic reaction (shock) and destruction (hemolysis) of transfused blood cells. Infectious diseases which are known to be transmitted by blood transfusion include certain types of viral Hepatitis(liver infection from a virus), Human Immunodeficiency Virus (HIV-1,2) infection, a viral infection known to cause Acquired Immunodeficiency Syndrome (AIDS), as well as certain other bacterial, viral, and parasitic diseases. While a minimal risk of acquiring an infectious disease from transfused blood exists, in accordance with the Federal and State law, all due care has been taken in donor selection and testing to avoid transmission of disease.     Alternatives  If loss of blood poses serious threats during your treatment, THERE IS NO EFFECTIVE ALTERNATIVE TO BLOOD TRANSFUSION. However, if you have any further questions on this matter, your provider will fully explain the alternatives to you if it has not already been done. I, ______________________________, have read/had read to me the above. I understand the matters bearing on the decision whether or not to authorize a transfusion of blood or blood components. I have no questions which have not been answered to my full satisfaction.  I hereby consent to such transfusion as my physician may deem necessary or advisable in the course of my treatment.      ______________________________________________                    ___________________________  Vale Gurney of Patient or Responsible party in case of minor,                 (Printed Name of Patient or incompetent, or unconscious patient)              Responsible Party)      ___________________________               _____________________  (Relationship to Patient if not self)                                    (Date and Time)      __________________________                                                           ______________________              (Signature of Witness)               (Printed Name of Witness)       Language line ()    Patient Name: Kiko Caro    : 1980                 Printed: 2023     Medical Record #: [id number]      Rev: 2023

## (undated) NOTE — LETTER
VACCINE ADMINISTRATION RECORD  PARENT / GUARDIAN APPROVAL  Date: 10/12/2023  Vaccine administered to: Mackenzie Found     : 1980    MRN: [id number]    A copy of the appropriate Centers for Disease Control and Prevention Vaccine Information statement has been provided. I have read or have had explained the information about the diseases and the vaccines listed below. There was an opportunity to ask questions and any questions were answered satisfactorily. I believe that I understand the benefits and risks of the vaccine cited and ask that the vaccine(s) listed below be given to me or to the person named above (for whom I am authorized to make this request). VACCINES ADMINISTERED:  Tdap    I have read and hereby agree to be bound by the terms of this agreement as stated above. My signature is valid until revoked by me in writing.   This document is signed by Grzegorz García, relationship: Self on 10/12/2023.:                                                                                                                                         Parent / [facility] Signature                                                Date

## (undated) NOTE — MR AVS SNAPSHOT
[facility]  [address] [address] [phone]                    After Visit Summary   1/4/2017    Michelle Trevizo    MRN: [id number]           Visit Information        Provider Department Dept Phone    1/4/2017 Call [phone] for help. [facility] is NOT to be used for urgent needs. For medical emergencies, dial 911.

## (undated) NOTE — LETTER
VACCINE ADMINISTRATION RECORD  PARENT / GUARDIAN APPROVAL  Date: 2023  Vaccine administered to: Michelle Mccall     : 1980    MRN: [id number]    A copy of the appropriate Centers for Disease Control and Prevention Vaccine Information statement has been provided. I have read or have had explained the information about the diseases and the vaccines listed below. There was an opportunity to ask questions and any questions were answered satisfactorily. I believe that I understand the benefits and risks of the vaccine cited and ask that the vaccine(s) listed below be given to me or to the person named above (for whom I am authorized to make this request). VACCINES ADMINISTERED:    RSV VACCINE     I have read and hereby agree to be bound by the terms of this agreement as stated above. My signature is valid until revoked by me in writing.   This document is signed by  Samuel Donovan  relationship: Self on 2023.:                                                                                                                                         Parent / Yahir Pepper Signature                                                Date